# Patient Record
Sex: MALE
[De-identification: names, ages, dates, MRNs, and addresses within clinical notes are randomized per-mention and may not be internally consistent; named-entity substitution may affect disease eponyms.]

---

## 2017-04-01 ENCOUNTER — HOSPITAL ENCOUNTER (INPATIENT)
Dept: HOSPITAL 31 - C.ER | Age: 52
LOS: 3 days | Discharge: HOME | DRG: 293 | End: 2017-04-04
Attending: HOSPITALIST | Admitting: HOSPITALIST
Payer: COMMERCIAL

## 2017-04-01 VITALS — BODY MASS INDEX: 29.4 KG/M2

## 2017-04-01 DIAGNOSIS — R94.31: ICD-10-CM

## 2017-04-01 DIAGNOSIS — Z87.01: ICD-10-CM

## 2017-04-01 DIAGNOSIS — E78.5: ICD-10-CM

## 2017-04-01 DIAGNOSIS — Z91.14: ICD-10-CM

## 2017-04-01 DIAGNOSIS — I11.0: Primary | ICD-10-CM

## 2017-04-01 DIAGNOSIS — J45.909: ICD-10-CM

## 2017-04-01 DIAGNOSIS — I50.43: ICD-10-CM

## 2017-04-01 DIAGNOSIS — Z87.891: ICD-10-CM

## 2017-04-01 DIAGNOSIS — R79.89: ICD-10-CM

## 2017-04-01 DIAGNOSIS — E05.90: ICD-10-CM

## 2017-04-01 DIAGNOSIS — R06.00: ICD-10-CM

## 2017-04-01 LAB
ALBUMIN/GLOB SERPL: 1.3 {RATIO} (ref 1–2.1)
ALP SERPL-CCNC: 55 U/L (ref 38–126)
ALT SERPL-CCNC: 34 U/L (ref 21–72)
AST SERPL-CCNC: 41 U/L (ref 17–59)
BASOPHILS # BLD AUTO: 0 K/UL (ref 0–0.2)
BASOPHILS NFR BLD: 0.4 % (ref 0–2)
BILIRUB SERPL-MCNC: 1.2 MG/DL (ref 0.2–1.3)
BILIRUB UR-MCNC: NEGATIVE MG/DL
BUN SERPL-MCNC: 10 MG/DL (ref 9–20)
CALCIUM SERPL-MCNC: 9 MG/DL (ref 8.6–10.4)
CHLORIDE SERPL-SCNC: 102 MMOL/L (ref 98–107)
CO2 SERPL-SCNC: 20 MMOL/L (ref 22–30)
EOSINOPHIL # BLD AUTO: 0.1 K/UL (ref 0–0.7)
EOSINOPHIL NFR BLD: 1.4 % (ref 0–4)
ERYTHROCYTE [DISTWIDTH] IN BLOOD BY AUTOMATED COUNT: 13.8 % (ref 11.5–14.5)
GLOBULIN SER-MCNC: 3.5 GM/DL (ref 2.2–3.9)
GLUCOSE SERPL-MCNC: 84 MG/DL (ref 75–110)
GLUCOSE UR STRIP-MCNC: NORMAL MG/DL
HCT VFR BLD CALC: 48.1 % (ref 35–51)
KETONES UR STRIP-MCNC: NEGATIVE MG/DL
LEUKOCYTE ESTERASE UR-ACNC: (no result) LEU/UL
LYMPHOCYTES # BLD AUTO: 1.2 K/UL (ref 1–4.3)
LYMPHOCYTES NFR BLD AUTO: 25.5 % (ref 20–40)
MCH RBC QN AUTO: 28.9 PG (ref 27–31)
MCHC RBC AUTO-ENTMCNC: 32.3 G/DL (ref 33–37)
MCV RBC AUTO: 89.4 FL (ref 80–94)
MONOCYTES # BLD: 0.4 K/UL (ref 0–0.8)
MONOCYTES NFR BLD: 8.2 % (ref 0–10)
NRBC BLD AUTO-RTO: 0.1 % (ref 0–2)
PH UR STRIP: 5 [PH] (ref 5–8)
PLATELET # BLD: 196 K/UL (ref 130–400)
PMV BLD AUTO: 8.9 FL (ref 7.2–11.7)
POTASSIUM SERPL-SCNC: 4.1 MMOL/L (ref 3.6–5.2)
PROT SERPL-MCNC: 7.9 G/DL (ref 6.3–8.3)
PROT UR STRIP-MCNC: NEGATIVE MG/DL
RBC # UR STRIP: NEGATIVE /UL
SODIUM SERPL-SCNC: 141 MMOL/L (ref 132–148)
SP GR UR STRIP: 1 (ref 1–1.03)
UROBILINOGEN UR-MCNC: NORMAL MG/DL (ref 0.2–1)
WBC # BLD AUTO: 4.5 K/UL (ref 4.8–10.8)

## 2017-04-01 RX ADMIN — METOPROLOL SUCCINATE SCH MG: 50 TABLET, EXTENDED RELEASE ORAL at 23:18

## 2017-04-01 RX ADMIN — IPRATROPIUM BROMIDE AND ALBUTEROL SULFATE SCH ML: .5; 3 SOLUTION RESPIRATORY (INHALATION) at 12:15

## 2017-04-01 RX ADMIN — IPRATROPIUM BROMIDE AND ALBUTEROL SULFATE SCH ML: .5; 3 SOLUTION RESPIRATORY (INHALATION) at 12:30

## 2017-04-01 NOTE — C.PDOC
History Of Present Illness


The patient, a 52 y/o male whose PMHx includes cardiac-related issues and HTN, 

presents to the ED for evaluation of shortness of breath which began a couple 

days ago. Patient denies fever, chills, cough, and chest pain. 





PMD: Dr. Noonan


Time Seen by Provider: 17 12:04


Chief Complaint (Nursing): Shortness Of Breath


History Per: Patient


History/Exam Limitations: no limitations


Onset/Duration Of Symptoms: Days


Current Symptoms Are (Timing): Still Present


Quality: denies: "Pain"


Current Respiratory Medications: See Home Med List


Associated Symptoms: denies: Fever, Chills, Chest Pain, Bloody Cough, 

Productive Cough


Additional History Per: Patient





Past Medical History


Reviewed: Historical Data, Nursing Documentation, Vital Signs


Vital Signs: 


 Last Vital Signs











Temp  97.9 F   17 12:03


 


Pulse  107 H  17 14:10


 


Resp  22   17 14:10


 


BP  146/104 H  17 14:10


 


Pulse Ox  93 L  17 15:09














- Medical History


PMH: CHF, HTN, Hypercholesterolemia, Hyperthyroidism, Pneumonia


   Denies: Chronic Kidney Disease


Surgical History: No Surg Hx


Family History: States: Unknown Family Hx





- Social History


Hx Alcohol Use: No


Hx Substance Use: No





- Immunization History


Hx Tetanus Toxoid Vaccination: No


Hx Influenza Vaccination: No


Hx Pneumococcal Vaccination: No





Review Of Systems


Except As Marked, All Systems Reviewed And Found Negative.


Constitutional: Negative for: Fever, Chills


Cardiovascular: Negative for: Chest Pain


Respiratory: Positive for: Shortness of Breath.  Negative for: Cough





Physical Exam





- Physical Exam


Appears: Non-toxic, No Acute Distress


Skin: Normal Color, Warm, Dry


Head: Atraumatic, Normacephalic


Eye(s): bilateral: Normal Inspection, EOMI


Oral Mucosa: Moist


Neck: Supple


Chest: Symmetrical, No Deformity, No Tenderness


Cardiovascular: Rhythm Regular, No Murmur


Respiratory: Rales (crackes at bilateral bases ), No Rhonchi, No Wheezing


Gastrointestinal/Abdominal: Soft, No Tenderness, No Guarding, No Rebound


Back: Normal Inspection, No Vertebral Tenderness, No Paraspinal Tenderness


Extremity: Normal ROM, Capillary Refill (less than 2 seconds), No Swelling


Pulses: Left Dorsalis Pedis: Normal, Right Dorsalis Pedis: Normal


Neurological/Psych: Oriented x3, Normal Speech, Normal Cognition


Gait: Steady





ED Course And Treatment





- Laboratory Results


Result Diagrams: 


 17 12:38





 17 12:38


Lab Interpretation: Normal


ECG: Interpreted By Me


ECG Rhythm: Sinus Tachycardia


ECG Interpretation: No Acute Changes


Rate From EC


O2 Sat by Pulse Oximetry: 93


Pulse Ox Interpretation: Abnormal





- Radiology


CXR: Interpreted by Me


CXR Interpretation: Yes: Cardiomegaly





- Other Rad


  ** CXR


X-Ray: Read By Radiologist


Interpretation: Accession No. : [id number].  Patient Name / ID : MAGDY FLORES  / [id number].  Exam Date : 2017 12:15:38 ( Approved ).  Study 

Comment :  Sex / Age : M  / 51Y.  Creator : Jose Leigh.  Dictator : Jose Leigh.   :  Approver : Jose Leigh.  Approver2 :  Report 

Date : 2017 12:29:41.  My Comment :  *************************************

**********************************************.  HISTORY:  SOB.  COMPARISON:  

Comparison is made to the previous study dated 2016.  TECHNIQUE:  Chest 

PA and lateral.  FINDINGS:  LUNGS:  Mild pulmonary vascular congestion P.  

PLEURA:  No significant pleural effusion identified. No pneumothorax apparent.  

CARDIOVASCULAR:  Cardiomegaly is again noted.  OSSEOUS STRUCTURES:  No 

significant abnormalities.  VISUALIZED UPPER ABDOMEN:  Normal.  OTHER FINDINGS:

  None.  IMPRESSION:  Cardiomegaly and mild pulmonary vascular congestion. No 

evidence of significant pleural effusion.


Progress Note: labs, EKG, CXR ordered and reviewed. Patient received Albuterol 

INH.  Treated with lasix 40 mg IV.  On re-evaluation lungs clear


Reassessment Condition: Improved





- Physician Consult Information


Physician Contacted: Jean-Pierre Noonan


Outcome Of Conversation: Admit to hospitalist service





Disposition


Discussed With Dr.: Linsey Orozco


Doctor Will See Patient In The: Hospital


Counseled Patient/Family Regarding: Studies Performed, Diagnosis





- Disposition


Disposition: HOSPITALIZED


Disposition Time: 14:40


Condition: STABLE





- POA


Present On Arrival: None





- Clinical Impression


Clinical Impression: 


 Chronic congestive heart failure, Dyspnea, HTN (hypertension)





- PA / NP / Resident Statement


MD/DO has reviewed & agrees with the documentation as recorded.





- Scribe Statement


The provider has reviewed the documentation as recorded by the Scribe (Yamileth Coronado)





All medical record entries made by the Scribe were at my direction and 

personally dictated by me. I have reviewed the chart and agree that the record 

accurately reflects my personal performance of the history, physical exam, 

medical decision making, and the department course for this patient. I have 

also personally directed, reviewed, and agree with the discharge instructions 

and disposition.





Decision To Admit





- Pt Status Changed To:


Hospital Disposition Of: Observation





- .


Bed Request Type: Telemetry


Admitting Physician: Linsey Orozco


Patient Diagnosis: 


 Chronic congestive heart failure, Dyspnea, HTN (hypertension)

## 2017-04-01 NOTE — CP.PCM.CON
History of Present Illness





- History of Present Illness


History of Present Illness: 


52 y/o male admitted with Exac of chf.  pt reportedly has an EF of 20%.  pt 

admits to 3 pillow orthopnea, pnd, and sig rosales.  no cp, no syncope, no palp.  

pt had similar episode last year.  stress revealed no reversible defects.  Pt 

was considering an AICD.  Of note pt felt congested several weeks ago and thus 

stopped all his heart failure medications.  pt ekg reveals ST with ST 

depressions in v5 and v6.  Trop mildly elevated.  





Review of Systems





- Constitutional


Constitutional: absent: As Per HPI, Anorexia, Chills, Daytime Sleepiness, 

Excessive Sweating, Fatigue, Fever, Frequent Falls, Headache, Increased Appetite

, Lethargy, Malaise, Night Sweats, Snoring, Sleep Apnea, Weight Gain, Weight 

Loss, Weakness, Other





- EENT


Eyes: absent: As Per HPI, Blind Spots, Blurred Vision, Change in Vision, 

Decreased Night Vision, Diplopia, Discharge, Dry Eye, Exophthalmos, Floaters, 

Irritation, Itchy Eyes, Loss of Peripheral Vision, Pain, Photophobia, Requires 

Corrective Lenses, Sees Flashes, Spots in Vision, Tunnel Vision, Other Visual 

Disturbances, Loss of Vision, Other


Ears: absent: As Per HPI, Decreased Hearing, Ear Discharge, Ear Pain, Tinnitus, 

Abnormal Hearing, Disequilibrium, Dizziness, Other


Nose/Mouth/Throat: absent: As Per HPI, Epistaxis, Nasal Congestion, Nasal 

Discharge, Nasal Obstruction, Nasal Trauma, Nose Pain, Post Nasal Drip, Sinus 

Pain, Sinus Pressure, Bleeding Gums, Change in Voice, Dental Pain, Dry Mouth, 

Dysphagia, Halitosis, Hoarsness, Lip Swelling, Mouth Lesions, Mouth Pain, 

Odynophagia, Sore Throat, Throat Swelling, Tongue Swelling, Facial Pain, Neck 

Pain, Neck Mass, Other





- Cardiovascular


Cardiovascular: As Per HPI





- Respiratory


Respiratory: As Per HPI





- Gastrointestinal


Gastrointestinal: absent: As Per HPI, Abdominal Pain, Belching, Bloating, 

Change in Bowel Habits, Change in Stool Character, Coffee Ground Emesis, 

Constipation, Cramping, Diarrhea, Dyspepsia, Dysphagia, Early Satiety, 

Excessive Flatus, Fecal Incontinence, Heartburn, Hematemesis, Hematochezia, 

Loose Stools, Melena, Nausea, Odynophagia, Temesmus, Vomiting, Other





- Genitourinary


Genitourinary: absent: As Per HPI, Change in Urinary Stream, Difficulty 

Urinating, Dysuria, Flank Pain, Hematuria, Pyuria, Nocturia, Urinary 

Incontinence, Urinary Frequency, Urinary Hesitance, Urinary Urgency, Voiding 

Freq/Small Amts, Freq UTI, Hx Renal/Bladder Calculi, Hx /Renal Surgery, 

Bladder Distension, Other





- Integumentary


Integumentary: absent: As Per HPI, Acne, Alopecia, Bleeding Lesions, Change in 

Hair, Change in Nails, Change in Pigmentation, Changing Lesions, Dry Skin, 

Erythema, Furuncle, Hirsutism, Lesions, New Lesions, Non-Healing Lesions, 

Photosensitivity, Pruritus, Rash, Skin Pain, Skin Ulcer, Sores, Striae, Swelling

, Unusual Bruising, Wounds, Jaundice, Other





- Neurological


Neurological: absent: As Per HPI, Abnormal Gait, Abnormal Hearing, Abnormal 

Movements, Abnormal Speech, Behavioral Changes, Burning Sensations, Confusion, 

Convulsions, Disequilibrium, Dizziness, Numbness, Focal Weakness, Frequent Falls

, Headaches, Lack of Coordination, Loss of Vision, Memory Loss, Paresthesias, 

Radicular Pain, Restless Legs, Sensory Deficit, Syncope, Tingling, Tremor, 

Vertigo, Weakness, Other Visual Disturbances, Other





- Psychiatric


Psychiatric: absent: As Per HPI, Abnormal Sleep Pattern, Anhedonia, Anxiety, 

Auditory Hallucinations, Behavioral Changes, Change in Appetite, Change in 

Libido, Confusion, Depression, Difficulty Concentrating, Hallucinations, 

Homicidal Ideation, Hopelessness, Irritability, Memory Loss, Mood Swings, Panic 

Attacks, Paranoia, Suicidal Ideation, Visual Hallucinations, Tactile 

Hallucinations, Other





Past Patient History





- Infectious Disease


Hx of Infectious Diseases: None





- Past Medical History & Family History


Past Medical History?: Yes





- Past Social History


Smoking Status: Former Smoker





- CARDIAC


Hx Congestive Heart Failure: Yes


Hx Hypercholesterolemia: Yes


Hx Hypertension: Yes





- PULMONARY


Hx Pneumonia: Yes





- HEENT


Other/Comment: wears reading glasses





- RENAL


Hx Chronic Kidney Disease: No





- ENDOCRINE/METABOLIC


Hx Hyperthyroidism: Yes





- MUSCULOSKELETAL/RHEUMATOLOGICAL


Hx Falls: No





- PSYCHIATRIC


Hx Substance Use: No





- SURGICAL HISTORY


Hx Surgeries: No





- ANESTHESIA


Hx Anesthesia: No


Hx Anesthesia Reactions: No





Meds


Allergies/Adverse Reactions: 


 Allergies











Allergy/AdvReac Type Severity Reaction Status Date / Time


 


enalapril maleate Allergy Intermediate SHORTNESS Verified 03/17/16 23:43





[From Vasotec]   OF BREATH  


 


enalaprilat dihydrate Allergy Intermediate SWELLING Verified 03/17/16 23:43





[From Vasotec]     


 


levofloxacin Allergy Unknown SHORTNESS Verified 03/17/16 23:43





   OF BREATH  














- Medications


Medications: 


 Current Medications





Albuterol/Ipratropium (Duoneb 3 Mg/0.5 Mg (3 Ml) Ud)  3 ml INH RQ6 PRN


   PRN Reason: Shortness of Breath


Aspirin (Aspirin Chewable)  81 mg PO DAILY [facility]


   Last Admin: 04/01/17 16:53 Dose:  81 mg


Aspirin (Aspirin)  325 mg PO DAILY [facility]


Enoxaparin Sodium (Lovenox)  90 mg SC Q12H [facility]


Enoxaparin Sodium (Lovenox)  40 mg SC ONCE ONE


   Stop: 04/01/17 21:31


Famotidine (Pepcid)  20 mg PO BID [facility]


   Last Admin: 04/01/17 18:41 Dose:  20 mg


Furosemide (Lasix)  40 mg IVP Q12 [facility]


Losartan Potassium (Cozaar)  50 mg PO DAILY [facility]


   Last Admin: 04/01/17 16:53 Dose:  50 mg


Metoprolol Succinate (Toprol Xl)  50 mg PO Q12 [facility]


Rosuvastatin Calcium (Crestor)  10 mg PO HS [facility]











Physical Exam





- Constitutional


Appears: Non-toxic





- Head Exam


Head Exam: ATRAUMATIC, NORMAL INSPECTION, NORMOCEPHALIC





- Eye Exam


Eye Exam: EOMI, Normal appearance, PERRL


Pupil Exam: NORMAL ACCOMODATION, PERRL





- ENT Exam


ENT Exam: Mucous Membranes Moist, Normal Exam





- Neck Exam


Additional comments: 


mild hjr





- Respiratory Exam


Respiratory Exam: Rales


Additional comments: 


mild bibasilar rales





- Cardiovascular Exam


Cardiovascular Exam: Tachycardia, REGULAR RHYTHM, Systolic Murmur





- GI/Abdominal Exam


GI & Abdominal Exam: Normal Bowel Sounds, Soft





- Extremities Exam


Additional comments: 


pedal edema.  pulses 2+





- Back Exam


Back exam: NORMAL INSPECTION





- Neurological Exam


Neurological exam: Alert, CN II-XII Intact, Normal Gait, Oriented x3, Reflexes 

Normal





- Psychiatric Exam


Psychiatric exam: Normal Affect, Normal Mood





- Skin


Skin Exam: Dry, Intact, Normal Color, Warm





Results





- Vital Signs


Recent Vital Signs: 


 Last Vital Signs











Temp  98 F   04/01/17 17:24


 


Pulse  114 H  04/01/17 18:00


 


Resp  20   04/01/17 17:24


 


BP  139/99 H  04/01/17 18:41


 


Pulse Ox  96   04/01/17 17:24














- Labs


Result Diagrams: 


 04/02/17 06:15





 04/02/17 06:15


Labs: 


 Laboratory Results - last 24 hr











  04/01/17 04/01/17





  16:44 20:02


 


Magnesium  1.8 


 


Total Creatine Kinase   204 H


 


CK-MB (Mass)   2.88


 


Troponin I, Quant   0.0820














- EKG Data


EKG Interpreted by: Myself


EKG shows normal: Sinus rhythm


Rate: Tachycardia





- EKG Data


Interpretation: Acute Ischemia





Assessment & Plan


(1) Dyspnea


Status: Acute





(2) Hyperlipidemia


Status: Acute





(3) HTN (hypertension)


Status: Chronic





(4) Acute on chronic combined systolic and diastolic congestive heart failure


Status: Acute





(5) EKG abnormality


Status: Acute


Comment: SUSPICIOUS FOR ISCHEMIA.








(6) Troponin I above reference range


Status: Acute








- Assessment and Plan (Free Text)


Plan: 


GIVEN EKG CHANGES WOULD CHANGE BB TO TOPROL XL 50 Q12


FULL ANTICOAG


TREND TROP


CONTINUE TELE


MONITOR LYTES


REPEAT EKG ONCE RATE CONTROLLED.


WILL CONSIDER CARDIAC CATH.


75 MIN TOTAL

## 2017-04-01 NOTE — RAD
HISTORY:

SOB  



COMPARISON:

Comparison is made to the previous study dated 03/18/2016



TECHNIQUE:

Chest PA and lateral



FINDINGS:



LUNGS:

Mild pulmonary vascular congestion P



PLEURA:

No significant pleural effusion identified. No pneumothorax apparent.



CARDIOVASCULAR:

Cardiomegaly is again noted



OSSEOUS STRUCTURES:

No significant abnormalities.



VISUALIZED UPPER ABDOMEN:

Normal.



OTHER FINDINGS:

None.



IMPRESSION:

Cardiomegaly and mild pulmonary vascular congestion. No evidence of 

significant pleural effusion.

## 2017-04-01 NOTE — CP.PCM.HP
<Rica Escamilla - Last Filed: 04/01/17 16:23>





History of Present Illness





- History of Present Illness


History of Present Illness: 


CC:  shortness of breath for 3 days





HPI: Patient is a 51year old male PMHx of CHF, HTN, asthma presenting with SOB 

for 3 days. Patient reported he did not come in earlier as he believed it was 

improving but it worsened greatly this morning as he was becoming short of 

breath upon working 10feet to the shower. Patient reported he saw his PMD Dr. Noonan 2 months ago and had not taken his medications for over a week as he didn

't feel like doing so and reported they were making him congested. Patient also 

states that he had been advised regarding AICD placement for over a year but is 

still thinking about it. Patient reports this morning he started feeling faint 

as well as short of breath when walking but denied any acute chest pain. 

Patient was last seen in  in 2016 and was in the ICU for 24hours due to "

fluid in lungs." Patient reports having to use 3 pillows to sleep otherwise he 

feels short of breath. Patient denied chest pain, palpitations, abdominal pain, 

nausea, vomiting, diarrhea, constipation, urinary complaints, pain in his legs. 

Patient denied any recent travel and denied any sick contacts. Patient admits 

to nonproductive cough, unintentional weight gain of 5 pounds in 2 months, and 

orthopnea.


As per medical records, patient had a stress test 1 year ago with an EF of 20%.





PMD: Dr. Noonan


PMHx: CHF, HTN, asthma


Meds: please see list


ALL: enalapril, levofloxacin


PSurg: denies


PHospitalization: 2016 for CHF exacerbation- was in ICU for 1 day


Family Hx: denies history of MI, CVA, cancer


Social Hx: quit tobacco abuse 2012 used to smoke 1ppd for 15 years. Drinks EtOH 

socially. Denies drug abuse. Lives with ex wife's mom. Works as customer 

service.





ROS: + shortness of breath, + cough, + unintentional weight gain of 5 pounds in 

2 months, + orthopnea





ED Course: CXR showed cardiomegaly and mild pulmonary vascular congestion. No 

evidence of significant pleural effusion. EKG showed sinus tach, possible L 

atrial enlargement, L axis deviation, LVH. Patient had 1 dose of lasix 40mg IVP.





Present on Admission





- Present on Admission


Any Indicators Present on Admission: No





Review of Systems





- Constitutional


Constitutional: As Per HPI, Weight Gain.  absent: Chills, Fever





- EENT


Eyes: As Per HPI.  absent: Blurred Vision


Ears: As Per HPI.  absent: Dizziness


Nose/Mouth/Throat: As Per HPI.  absent: Nasal Congestion, Sore Throat





- Cardiovascular


Cardiovascular: As Per HPI, Dyspnea, Dyspnea on Exertion, Orthopnea.  absent: 

Chest Pain, Chest Pain at Rest, Edema, Palpitations, Pedal Edema, Syncope





- Respiratory


Respiratory: As Per HPI, Dyspnea, Dyspnea on Exertion.  absent: Cough, 

Hemoptysis, Wheezing, Chest Congestion





- Gastrointestinal


Gastrointestinal: As Per HPI.  absent: Abdominal Pain, Constipation, Diarrhea, 

Nausea, Vomiting





- Genitourinary


Genitourinary: As Per HPI, Urinary Frequency (increased urinary frequency which 

patient associates with lasix).  absent: Dysuria, Nocturia





- Musculoskeletal


Musculoskeletal: As Per HPI.  absent: Numbness, Tingling





- Integumentary


Integumentary: As Per HPI.  absent: Dry Skin, Rash





- Neurological


Neurological: As Per HPI.  absent: Dizziness, Numbness, Focal Weakness, Syncope





- Psychiatric


Psychiatric: As Per HPI.  absent: Anxiety, Depression





- Endocrine


Endocrine: As Per HPI, Polyuria.  absent: Palpitations, Polydipsia, Polyphagia





- Hematologic/Lymphatic


Hematologic: As Per HPI.  absent: Easy Bleeding, Easy Bruising





Past Patient History





- Infectious Disease


Hx of Infectious Diseases: None





- Past Medical History & Family History


Past Medical History?: Yes





- Past Social History


Smoking Status: Former Smoker





- CARDIAC


Hx Congestive Heart Failure: Yes


Hx Hypercholesterolemia: Yes


Hx Hypertension: Yes





- PULMONARY


Hx Pneumonia: Yes





- HEENT


Other/Comment: wears reading glasses





- RENAL


Hx Chronic Kidney Disease: No





- ENDOCRINE/METABOLIC


Hx Hyperthyroidism: Yes





- MUSCULOSKELETAL/RHEUMATOLOGICAL


Hx Falls: No





- PSYCHIATRIC


Hx Substance Use: No





- SURGICAL HISTORY


Hx Surgeries: No





- ANESTHESIA


Hx Anesthesia: No


Hx Anesthesia Reactions: No





Meds


Allergies/Adverse Reactions: 


 Allergies











Allergy/AdvReac Type Severity Reaction Status Date / Time


 


enalapril maleate Allergy Intermediate SHORTNESS Verified 03/17/16 23:43





[From Vasotec]   OF BREATH  


 


enalaprilat dihydrate Allergy Intermediate SWELLING Verified 03/17/16 23:43





[From Vasotec]     


 


levofloxacin Allergy Unknown SHORTNESS Verified 03/17/16 23:43





   OF BREATH  














Physical Exam





- Constitutional


Appears: Well, Non-toxic, No Acute Distress





- Head Exam


Head Exam: ATRAUMATIC, NORMAL INSPECTION





- Eye Exam


Eye Exam: Normal appearance.  absent: Conjunctival injection, Scleral icterus





- ENT Exam


ENT Exam: Mucous Membranes Moist





- Neck Exam


Neck exam: Positive for: Normal Inspection.  Negative for: Tenderness





- Respiratory Exam


Respiratory Exam: Clear to Auscultation Bilateral, NORMAL BREATHING PATTERN.  

absent: Rales, Rhonchi, Wheezes





- Cardiovascular Exam


Cardiovascular Exam: Tachycardia, REGULAR RHYTHM, +S4.  absent: Systolic Murmur





- GI/Abdominal Exam


GI & Abdominal Exam: Normal Bowel Sounds, Soft.  absent: Firm, Guarding, Rigid, 

Tenderness





- Extremities Exam


Extremities exam: Positive for: normal capillary refill, normal inspection, 

pedal pulses present.  Negative for: pedal edema





- Back Exam


Back exam: NORMAL INSPECTION.  absent: rash noted





- Neurological Exam


Neurological exam: Alert, Oriented x3





- Psychiatric Exam


Psychiatric exam: Normal Affect, Normal Mood





- Skin


Skin Exam: Dry, Intact, Normal Color, Warm





Results





- Vital Signs


Recent Vital Signs: 





 Last Vital Signs











Temp  97.9 F   04/01/17 12:03


 


Pulse  107 H  04/01/17 14:10


 


Resp  22   04/01/17 14:10


 


BP  146/104 H  04/01/17 14:10


 


Pulse Ox  93 L  04/01/17 14:33














- Labs


Result Diagrams: 


 04/01/17 12:38





 04/01/17 12:38





Assessment & Plan





- Assessment and Plan (Free Text)


Assessment: 


51 year old male PMHx CHF, HTN, asthma admitted for shortness of breath


Plan: 


Acute systolic CHF exacerbation


Lasix 40mg IVP Q12


Losartan 50mg PO daily


Lopressor 25mg PO BID


ASA 81mg PO daily


Crestor 10mg PO HS


f/u Echo


f/u DEBRA and EKG


proBNP 2370


D-dimer < 200


CXR 4/1/17: cardiomegaly and mild pulmonary vascular congestion. No evidence of 

significant pleural effusion


f/u AM labs


Cardio consult Dr. Lazcano f/u reccs





Hypertension


Losartan 50mg PO daily


Lopressor 25mg PO BID


ASA 81mg PO daily


Crestor 10mg PO HS


Monitor





Asthma


Duoneb 3ml INH Q6 PRN





PPX


Lovenox 40mg SC daily


Pepcid 20mg PO BID


SCDs


Heart Healthy Diet


Is and Os


Measure Daily Weight


Heart healthy diet with fluid restriction 1500cc/hr





Plan discussed with Dr. Ernesto Escamilla PGY1








<ErnestoLinsey ROSANGELA - Last Filed: 04/01/17 18:33>





Results





- Vital Signs


Recent Vital Signs: 





 Last Vital Signs











Temp  98 F   04/01/17 17:24


 


Pulse  114 H  04/01/17 17:24


 


Resp  20   04/01/17 17:24


 


BP  139/99 H  04/01/17 17:24


 


Pulse Ox  96   04/01/17 17:24














- Labs


Result Diagrams: 


 04/01/17 12:38





 04/01/17 12:38


Labs: 





 Laboratory Results - last 24 hr











  04/01/17





  16:44


 


Magnesium  1.8














Attending/Attestation





- Attestation


I have personally seen and examined this patient.: Yes


I have fully participated in the care of the patient.: Yes


I have reviewed all pertinent clinical information: Yes


Notes (Text): 


Patient seen, examined, and case discussed with day time resident.


Patient seen in [place] [id number] ED with resident at bedside.


Patient with history of systolic congestive heart failure reports shortness of 

breathe and associated chest congestion for 2-3 weeks. Patient reports he 

thought the medications he was using was making his congestion worse and decide 

to stop on his own. Patient only takes Aspirin daily. Patient denies history of 

heart attack, denies family hx of heart attack. Patient is a former smoker. 

Patient has history of hypertension. Patient recommended for icd by his 

cardiologist but reports "he will think about it". Noted in review of EMR, 

patient has had stress test which revealed EF: 20% and he reports he is aware 

and used to follow-up with his cardiologist every month but stopped. Patient 

reports he drinks a powerade and 2-3 molina a day. Patient has dyspnea on 

exertion, associated orthopena, and uses 3 pillows at night.





Discussed with ED, Dr. Noonan would like the patient admitted to medicine and 

would like the on-call cardiology for consult. 


Discussed admitting orders with day-time resident.


Cardiology consulted (Dr. Lazcano) will see the patient later today.





Assessment/Plan: 


Acute systolic CHF exacerbation


admit to telemetry


Lasix 40mg IVP Q12


Losartan 50mg PO daily


Lopressor 25mg PO BID


ASA 81mg PO daily


Crestor 10mg PO HS


f/u Echo


f/u DEBRA and EKG, Q 6hours X2


proBNP 2370


D-dimer < 200


CXR 4/1/17: cardiomegaly and mild pulmonary vascular congestion. No evidence of 

significant pleural effusion


Cardio consult Dr. Lazcano f/u reccs


DVT ppx


Lipid panel, TSH, hgb1ac in AM





Hypertension


Losartan 50mg PO daily


Lopressor 25mg PO BID


Monitor


monitor vitals 


heart healthy diet





Asthma


Duoneb 3ml INH Q6 PRN


patient is not symptomatic





PPX


Lovenox 40mg SC daily


Pepcid 20mg PO BID


SCDs


Heart Healthy Diet


Is and Os


Measure Daily Weight


Heart healthy diet with fluid restriction 1500cc/hr

## 2017-04-02 LAB
ALBUMIN/GLOB SERPL: 1.3 {RATIO} (ref 1–2.1)
ALP SERPL-CCNC: 60 U/L (ref 38–126)
ALT SERPL-CCNC: 31 U/L (ref 21–72)
AST SERPL-CCNC: 43 U/L (ref 17–59)
BASOPHILS # BLD AUTO: 0 K/UL (ref 0–0.2)
BASOPHILS NFR BLD: 0.4 % (ref 0–2)
BILIRUB SERPL-MCNC: 1.2 MG/DL (ref 0.2–1.3)
BUN SERPL-MCNC: 18 MG/DL (ref 9–20)
CALCIUM SERPL-MCNC: 8.7 MG/DL (ref 8.6–10.4)
CHLORIDE SERPL-SCNC: 100 MMOL/L (ref 98–107)
CHOLEST SERPL-MCNC: 242 MG/DL (ref 0–199)
CO2 SERPL-SCNC: 25 MMOL/L (ref 22–30)
EOSINOPHIL # BLD AUTO: 0.1 K/UL (ref 0–0.7)
EOSINOPHIL NFR BLD: 2.3 % (ref 0–4)
ERYTHROCYTE [DISTWIDTH] IN BLOOD BY AUTOMATED COUNT: 13.4 % (ref 11.5–14.5)
GLOBULIN SER-MCNC: 3.2 GM/DL (ref 2.2–3.9)
GLUCOSE SERPL-MCNC: 94 MG/DL (ref 75–110)
HCT VFR BLD CALC: 47.5 % (ref 35–51)
LYMPHOCYTES # BLD AUTO: 1.4 K/UL (ref 1–4.3)
LYMPHOCYTES NFR BLD AUTO: 34.4 % (ref 20–40)
MAGNESIUM SERPL-MCNC: 2 MG/DL (ref 1.6–2.3)
MCH RBC QN AUTO: 29.2 PG (ref 27–31)
MCHC RBC AUTO-ENTMCNC: 32.7 G/DL (ref 33–37)
MCV RBC AUTO: 89.2 FL (ref 80–94)
MONOCYTES # BLD: 0.5 K/UL (ref 0–0.8)
MONOCYTES NFR BLD: 12.5 % (ref 0–10)
NRBC BLD AUTO-RTO: 0.1 % (ref 0–2)
PHOSPHATE SERPL-MCNC: 4.8 MG/DL (ref 2.5–4.5)
PLATELET # BLD: 200 K/UL (ref 130–400)
PMV BLD AUTO: 8.7 FL (ref 7.2–11.7)
POTASSIUM SERPL-SCNC: 3.3 MMOL/L (ref 3.6–5.2)
PROT SERPL-MCNC: 7.2 G/DL (ref 6.3–8.3)
SODIUM SERPL-SCNC: 140 MMOL/L (ref 132–148)
TSH SERPL-ACNC: 3.58 MIU/L (ref 0.46–4.68)
WBC # BLD AUTO: 4.1 K/UL (ref 4.8–10.8)

## 2017-04-02 RX ADMIN — METOPROLOL SUCCINATE SCH MG: 50 TABLET, EXTENDED RELEASE ORAL at 09:41

## 2017-04-02 RX ADMIN — ENOXAPARIN SODIUM SCH MG: 100 INJECTION SUBCUTANEOUS at 10:58

## 2017-04-02 RX ADMIN — METOPROLOL SUCCINATE SCH MG: 50 TABLET, EXTENDED RELEASE ORAL at 21:00

## 2017-04-02 RX ADMIN — ENOXAPARIN SODIUM SCH MG: 100 INJECTION SUBCUTANEOUS at 21:00

## 2017-04-02 NOTE — CP.PCM.PN
Subjective





- Date & Time of Evaluation


Date of Evaluation: 04/02/17


Time of Evaluation: 14:39





- Subjective


Subjective: 


PT FEELS BETTER.  STATES THAT HE MOST LIKELY HAS NANY.  HE HAS NOT BEEN TESTED 

FOR IT.  HE STATES HE [place] [facility] [place] BUT IS UNAWARE OF 

RESULTS.  





Objective





- Vital Signs/Intake and Output


Vital Signs (last 24 hours): 


 











Temp Pulse Resp BP Pulse Ox


 


 97.6 F   72   20   115/85   96 


 


 04/02/17 08:08  04/02/17 08:08  04/02/17 08:08  04/02/17 10:56  04/02/17 08:08








Intake and Output: 


 











 04/02/17 04/02/17





 06:59 18:59


 


Intake Total 500 600


 


Output Total 1300 


 


Balance -800 600














- Medications


Medications: 


 Current Medications





Albuterol/Ipratropium (Duoneb 3 Mg/0.5 Mg (3 Ml) Ud)  3 ml INH RQ6 PRN


   PRN Reason: Shortness of Breath


Aspirin (Aspirin)  325 mg PO DAILY [facility]


   Last Admin: 04/02/17 09:41 Dose:  325 mg


Enoxaparin Sodium (Lovenox)  90 mg SC Q12H [facility]


   Last Admin: 04/02/17 10:58 Dose:  90 mg


Famotidine (Pepcid)  20 mg PO BID [facility]


   Last Admin: 04/02/17 09:41 Dose:  20 mg


Furosemide (Lasix)  20 mg IVP DAILY [facility]


   Last Admin: 04/02/17 10:56 Dose:  20 mg


Losartan Potassium (Cozaar)  50 mg PO DAILY [facility]


   Last Admin: 04/02/17 09:41 Dose:  50 mg


Metoprolol Succinate (Toprol Xl)  50 mg PO Q12 [facility]


   Last Admin: 04/02/17 09:41 Dose:  50 mg


Rosuvastatin Calcium (Crestor)  10 mg PO HS [facility]


   Last Admin: 04/01/17 23:17 Dose:  10 mg











- Labs


Labs: 


 





 04/02/17 06:15 





 04/02/17 06:15 











- Constitutional


Appears: Well





- Head Exam


Head Exam: ATRAUMATIC, NORMAL INSPECTION, NORMOCEPHALIC





- Eye Exam


Eye Exam: EOMI, Normal appearance, PERRL


Pupil Exam: NORMAL ACCOMODATION, PERRL





- ENT Exam


ENT Exam: Mucous Membranes Moist, Normal Exam





- Neck Exam


Neck Exam: Full ROM, Normal Inspection.  absent: Lymphadenopathy





- Respiratory Exam


Respiratory Exam: Rales, NORMAL BREATHING PATTERN





- Cardiovascular Exam


Cardiovascular Exam: REGULAR RHYTHM, +S1, +S2, Murmur





- GI/Abdominal Exam


GI & Abdominal Exam: Soft, Normal Bowel Sounds.  absent: Tenderness





- Extremities Exam


Extremities Exam: Full ROM, Normal Capillary Refill, Normal Inspection.  absent

: Joint Swelling, Pedal Edema





- Back Exam


Back Exam: NORMAL INSPECTION





- Neurological Exam


Neurological Exam: Alert, Awake, CN II-XII Intact, Normal Gait, Oriented x3





- Psychiatric Exam


Psychiatric exam: Normal Affect, Normal Mood





- Skin


Skin Exam: Dry, Intact, Normal Color, Warm





Assessment and Plan


(1) Dyspnea


Status: Acute





(2) Hyperlipidemia


Status: Acute





(3) HTN (hypertension)


Status: Chronic





(4) Acute on chronic combined systolic and diastolic congestive heart failure


Status: Acute





(5) EKG abnormality


Status: Acute





(6) Troponin I above reference range


Status: Acute








- Assessment and Plan (Free Text)


Plan: 


WILL ATTEMPT TO LOCATE PTS RECORDS [place] [facility] [place]


RECHECK TROP NOW


RECHECK EKG NOW


I AM CONSIDERING CARDIAC CATH GIVEN ECG CHANGES AND CHF


WOULD EVAL FOR NANY AS OUTPT


REINFORCE MED COMPLIANCE.


MONITOR LABS.


CONTINUE FULL ANTICOAG AT THIS TIME.


45 MIN TOTAL CARE TIME

## 2017-04-02 NOTE — CP.PCM.PN
<Jadiel Rosado - Last Filed: 04/02/17 20:39>





Subjective





- Date & Time of Evaluation


Date of Evaluation: 04/02/17


Time of Evaluation: 10:00





- Subjective


Subjective: 








Dr. Arcos Service:





Patient seen and discussd with Dr. Arcos primary medical attending.  Patient 

reports coming to the hospital due to shortness of breath and chest pain.  But 

he says his chest pain has resolved and his breathing has improved.  He denies 

changes in vision, hearing, palliations, cough, nausea, vomiting, dsysuria, 

weakness, or joint pain. 





Objective





- Vital Signs/Intake and Output


Vital Signs (last 24 hours): 


 











Temp Pulse Resp BP Pulse Ox


 


 97.6 F   72   20   115/85   96 


 


 04/02/17 08:08  04/02/17 08:08  04/02/17 08:08  04/02/17 10:56  04/02/17 08:08








Intake and Output: 


 











 04/02/17 04/02/17





 06:59 18:59


 


Intake Total 500 


 


Output Total 1300 


 


Balance -800 














- Medications


Medications: 


 Current Medications





Albuterol/Ipratropium (Duoneb 3 Mg/0.5 Mg (3 Ml) Ud)  3 ml INH RQ6 PRN


   PRN Reason: Shortness of Breath


Aspirin (Aspirin)  [facility]ILY [facility]


   Last Admin: 04/02/17 09:41 Dose:  325 mg


Enoxaparin Sodium (Lovenox)  90 mg SC [facility]H [facility]


   Last Admin: 04/02/17 10:58 Dose:  90 mg


Famotidine (Pepcid)  20 mg [facility] BID [facility]


   Last Admin: 04/02/17 09:41 Dose:  20 mg


Furosemide (Lasix)  20 mg IVP [facility]ILY [facility]


   Last Admin: 04/02/17 10:56 Dose:  20 mg


Losartan Potassium (Cozaar)  50 mg PO [facility]ILY [facility]


   Last Admin: 04/02/17 09:41 Dose:  50 mg


Metoprolol Succinate (Toprol Xl)  50 mg PO Q12 [facility]


   Last Admin: 04/02/17 09:41 Dose:  50 mg


Rosuvastatin Calcium (Crestor)  10 mg PO HS [facility]


   Last Admin: 04/01/17 23:17 Dose:  10 mg











- Labs


Labs: 


 





 04/02/17 06:15 





 04/02/17 06:15 











- Constitutional


Appears: Non-toxic, No Acute Distress





- Head Exam


Head Exam: ATRAUMATIC, NORMAL INSPECTION, NORMOCEPHALIC





- Eye Exam


Eye Exam: Normal appearance





- ENT Exam


ENT Exam: Normal Exam





- Respiratory Exam


Respiratory Exam: Clear to Ausculation Bilateral.  absent: Rhonchi, Wheezes





- Cardiovascular Exam


Cardiovascular Exam: REGULAR RHYTHM, RRR, +S1, +S2, Murmur.  absent: Gallop, 

Rubs





- GI/Abdominal Exam


GI & Abdominal Exam: Soft, Normal Bowel Sounds.  absent: Distended, Firm, 

Tenderness





- Extremities Exam


Extremities Exam: Normal Inspection.  absent: Pedal Edema





- Back Exam


Back Exam: NORMAL INSPECTION





- Psychiatric Exam


Psychiatric exam: Normal Affect, Normal Mood





- Skin


Skin Exam: Normal Color, Warm





Assessment and Plan





- Assessment and Plan (Free Text)


Assessment: 


Acute systolic CHF exacerbation


4/2:


Dr. Lazcano consulted, he is now on full anticoagulation, have keptd him NPO past 

midnight in case he goes for cardiac cath tomorrow, Krista are negative.  

continue current medication. 





Previous note:


Lasix 40mg IVP Q12


Losartan 50mg PO daily


Lopressor 25mg PO BID


ASA 81mg PO daily


Crestor 10mg PO HS


f/u Echo


f/u KRISTA and EKG


proBNP 2370


D-dimer < 200


CXR 4/1/17: cardiomegaly and mild pulmonary vascular congestion. No evidence of 

significant pleural effusion


f/u AM labs


Cardio consult Dr. Lazcano f/u reccs





Hypertension


Losartan 50mg PO daily


Lopressor 25mg PO BID


ASA 81mg PO daily


Crestor 10mg PO HS


Monitor





Asthma


Duoneb 3ml INH Q6 PRN





PPX


Lovenox 40mg SC daily


Pepcid 20mg PO BID


SCDs


Heart Healthy Diet


Is and Os


Measure Daily Weight


Heart healthy diet with fluid restriction 1500cc/hr











<Pepe Arcos - Last Filed: 04/06/17 14:47>





Objective





- Vital Signs/Intake and Output


Vital Signs (last 24 hours): 


 











Temp Pulse Resp BP Pulse Ox


 


 98 F   79   20   100/62   99 


 


 04/04/17 15:53  04/04/17 15:53  04/04/17 15:53  04/04/17 15:53  04/04/17 15:53











- Labs


Labs: 


 











PT  11.9 SECONDS (9.7-12.2)   04/03/17  06:46    


 


INR  1.1   04/03/17  06:46    


 


APTT  34 SECONDS (21-34)   04/03/17  06:46    














Attending/Attestation





- Attestation


I have personally seen and examined this patient.: Yes


I have fully participated in the care of the patient.: Yes


I have reviewed all pertinent clinical information, including history, physical 

exam and plan: Yes


Notes (Text): 





04/06/17 14:46


Patient was seen and examined at bedside with the resident


No new complaints


Plan for cardiac catheterization as per cardiology


Continue current medical management


This is late computer entry

## 2017-04-03 VITALS — RESPIRATION RATE: 20 BRPM

## 2017-04-03 LAB
APTT BLD: 34 SECONDS (ref 21–34)
BASOPHILS # BLD AUTO: 0 K/UL (ref 0–0.2)
BASOPHILS NFR BLD: 0.3 % (ref 0–2)
BUN SERPL-MCNC: 21 MG/DL (ref 9–20)
CALCIUM SERPL-MCNC: 9.1 MG/DL (ref 8.6–10.4)
CHLORIDE SERPL-SCNC: 102 MMOL/L (ref 98–107)
CO2 SERPL-SCNC: 25 MMOL/L (ref 22–30)
EOSINOPHIL # BLD AUTO: 0.2 K/UL (ref 0–0.7)
EOSINOPHIL NFR BLD: 2.8 % (ref 0–4)
ERYTHROCYTE [DISTWIDTH] IN BLOOD BY AUTOMATED COUNT: 13.6 % (ref 11.5–14.5)
GLUCOSE SERPL-MCNC: 92 MG/DL (ref 75–110)
HCT VFR BLD CALC: 50.2 % (ref 35–51)
INR PPP: 1.1
LYMPHOCYTES # BLD AUTO: 2.6 K/UL (ref 1–4.3)
LYMPHOCYTES NFR BLD AUTO: 48.1 % (ref 20–40)
MAGNESIUM SERPL-MCNC: 2.1 MG/DL (ref 1.6–2.3)
MCH RBC QN AUTO: 28.9 PG (ref 27–31)
MCHC RBC AUTO-ENTMCNC: 32 G/DL (ref 33–37)
MCV RBC AUTO: 90.2 FL (ref 80–94)
MONOCYTES # BLD: 0.6 K/UL (ref 0–0.8)
MONOCYTES NFR BLD: 11.4 % (ref 0–10)
NRBC BLD AUTO-RTO: 0.1 % (ref 0–2)
PLATELET # BLD: 199 K/UL (ref 130–400)
PMV BLD AUTO: 9.4 FL (ref 7.2–11.7)
POTASSIUM SERPL-SCNC: 4.3 MMOL/L (ref 3.6–5.2)
SODIUM SERPL-SCNC: 142 MMOL/L (ref 132–148)
WBC # BLD AUTO: 5.4 K/UL (ref 4.8–10.8)

## 2017-04-03 RX ADMIN — METOPROLOL SUCCINATE SCH MG: 50 TABLET, EXTENDED RELEASE ORAL at 11:30

## 2017-04-03 RX ADMIN — METOPROLOL SUCCINATE SCH MG: 50 TABLET, EXTENDED RELEASE ORAL at 21:43

## 2017-04-03 RX ADMIN — ENOXAPARIN SODIUM SCH MG: 100 INJECTION SUBCUTANEOUS at 11:32

## 2017-04-03 NOTE — CP.PCM.PN
Subjective





- Date & Time of Evaluation


Date of Evaluation: 04/03/17


Time of Evaluation: 14:00





- Subjective


Subjective: 


pt without complaints of cp or rosales.  positive orthopnea.  repeat echo reveals 

dcm and ef of 12%.  





Objective





- Vital Signs/Intake and Output


Vital Signs (last 24 hours): 


 











Temp Pulse Resp BP Pulse Ox


 


 98.6 F   86   20   126/89   98 


 


 04/03/17 11:28  04/03/17 11:28  04/03/17 11:28  04/03/17 11:32  04/03/17 11:28











- Medications


Medications: 


 Current Medications





Aspirin (Aspirin)  325 mg PO DAILY [facility]


   Last Admin: 04/03/17 11:30 Dose:  325 mg


Bumetanide (Bumex)  1 mg IVP Q12 [facility]


   Stop: 04/04/17 22:01


Enoxaparin Sodium (Lovenox)  40 mg SC DAILY [facility]


Famotidine (Pepcid)  20 mg PO BID [facility]


   Last Admin: 04/03/17 11:30 Dose:  20 mg


Losartan Potassium (Cozaar)  50 mg PO DAILY [facility]


   Last Admin: 04/03/17 11:29 Dose:  50 mg


Metoprolol Succinate (Toprol Xl)  50 mg PO Q12 [facility]


   Last Admin: 04/03/17 11:30 Dose:  50 mg


Rosuvastatin Calcium (Crestor)  10 mg PO HS [facility]


   Last Admin: 04/02/17 21:00 Dose:  10 mg











- Labs


Labs: 


 











PT  11.9 SECONDS (9.7-12.2)   04/03/17  06:46    


 


INR  1.1   04/03/17  06:46    


 


APTT  34 SECONDS (21-34)   04/03/17  06:46    














- Constitutional


Appears: Well





- Head Exam


Head Exam: ATRAUMATIC, NORMAL INSPECTION, NORMOCEPHALIC





- Eye Exam


Eye Exam: EOMI, Normal appearance, PERRL


Pupil Exam: NORMAL ACCOMODATION, PERRL





- ENT Exam


ENT Exam: Mucous Membranes Moist, Normal Exam





- Neck Exam


Neck Exam: Full ROM, Normal Inspection.  absent: Lymphadenopathy





- Respiratory Exam


Respiratory Exam: Rales, NORMAL BREATHING PATTERN





- Cardiovascular Exam


Cardiovascular Exam: REGULAR RHYTHM, +S1, +S2, Murmur





- GI/Abdominal Exam


GI & Abdominal Exam: Soft, Normal Bowel Sounds.  absent: Tenderness





- Extremities Exam


Extremities Exam: Full ROM, Normal Capillary Refill, Normal Inspection.  absent

: Joint Swelling, Pedal Edema





- Back Exam


Back Exam: NORMAL INSPECTION





- Neurological Exam


Neurological Exam: Alert, Awake, CN II-XII Intact, Normal Gait, Oriented x3





- Psychiatric Exam


Psychiatric exam: Normal Affect, Normal Mood





- Skin


Skin Exam: Dry, Intact, Normal Color, Warm





Assessment and Plan


(1) Dyspnea


Status: Acute





(2) Hyperlipidemia


Status: Acute





(3) HTN (hypertension)


Status: Chronic





(4) Acute on chronic combined systolic and diastolic congestive heart failure


Status: Acute





(5) EKG abnormality


Status: Acute





(6) Troponin I above reference range


Status: Acute








- Assessment and Plan (Free Text)


Plan: 


 repeat echo reveals dcm and ef of 12%. 


reviewed records at [facility] [place]  pt had no cad on cath in 2013.


st last year no ischemia


d/w residents and EP regarding ppm


Pt is undecided.  I discussed this w him at length.  explained risks and 

benefits.  risk of scd.  need for elsie w/u as outpt.


decrease lovenox to 40 daily


given echo would increase diuretics to 1mg bumex bid x 2 days then decrease to 

current lasix dosing.


monitor bnp and mag


65 min total care.

## 2017-04-03 NOTE — CP.PCM.CON
<Hung Timmons - Last Filed: 04/03/17 14:00>





History of Present Illness





- History of Present Illness


History of Present Illness: 


Cardiology Consult Note- Dr. Bojorquez





Patient was seen and examined at bedside. Patient reports that he has a hx of 

CHF and was told that he may need an ICD. He says he has previously worn a life 

vest before for about one year. He currently has no acute complaints, his 

breathing is markedly improved from he first came to the hospital. 





PMHx: CHF, HTN, asthma


ALL: enalapril, levofloxacin, iodine/contrast


PSurg: denies


PHospitalization: 2016 for CHF exacerbation- was in ICU for 1 day


Family Hx: denies history of MI, CVA, cancer


Social Hx: quit tobacco abuse 2012 used to smoke 1ppd for 15 years. Drinks EtOH 

socially. Denies drug abus





Review of Systems





- Constitutional


Constitutional: absent: Anorexia, Chills, Fever, Weight Loss, Weakness





- Cardiovascular


Cardiovascular: absent: Chest Pain, Irregular Heart Rhythm, Leg Edema, 

Palpitations, Pedal Edema





- Respiratory


Respiratory: absent: Cough, Hemoptysis, Dyspnea on Exertion, Wheezing





- Gastrointestinal


Gastrointestinal: absent: Abdominal Pain, Constipation, Diarrhea, Nausea, 

Vomiting





- Genitourinary


Genitourinary: absent: Difficulty Urinating, Dysuria, Hematuria





- Musculoskeletal


Musculoskeletal: absent: Atrophy, Back Pain, Myalgias, Numbness, Tingling





- Integumentary


Integumentary: absent: Skin Ulcer, Sores, Striae, Wounds





- Neurological


Neurological: absent: Abnormal Movements, Tingling, Tremor, Weakness





- Psychiatric


Psychiatric: absent: Anhedonia, Anxiety, Panic Attacks, Suicidal Ideation





- Endocrine


Endocrine: absent: Fatigue, Palpitations





Past Patient History





- Infectious Disease


Hx of Infectious Diseases: None





- Past Medical History & Family History


Past Medical History?: Yes





- Past Social History


Smoking Status: Former Smoker


Chewing Tobacco Use: No


Cigar Use: No


Alcohol: Social


Drugs: Denies





- CARDIAC


Hx Congestive Heart Failure: Yes


Hx Hypercholesterolemia: Yes


Hx Hypertension: Yes





- PULMONARY


Hx Pneumonia: Yes





- HEENT


Other/Comment: wears reading glasses





- RENAL


Hx Chronic Kidney Disease: No





- ENDOCRINE/METABOLIC


Hx Hyperthyroidism: Yes





- MUSCULOSKELETAL/RHEUMATOLOGICAL


Hx Falls: No





- PSYCHIATRIC


Hx Substance Use: No





- SURGICAL HISTORY


Hx Surgeries: No





- ANESTHESIA


Hx Anesthesia: No


Hx Anesthesia Reactions: No





Meds


Allergies/Adverse Reactions: 


 Allergies











Allergy/AdvReac Type Severity Reaction Status Date / Time


 


enalapril maleate Allergy Intermediate SHORTNESS Verified 03/17/16 23:43





[From Vasotec]   OF BREATH  


 


enalaprilat dihydrate Allergy Intermediate SWELLING Verified 03/17/16 23:43





[From Vasotec]     


 


levofloxacin Allergy Unknown SHORTNESS Verified 03/17/16 23:43





   OF BREATH  














- Medications


Medications: 


 Current Medications





Albuterol/Ipratropium (Duoneb 3 Mg/0.5 Mg (3 Ml) Ud)  3 ml INH RQ6 PRN


   PRN Reason: Shortness of Breath


Aspirin (Aspirin)  325 mg PO DAILY [facility]


   Last Admin: 04/03/17 11:30 Dose:  325 mg


Enoxaparin Sodium (Lovenox)  90 mg SC Q12H [facility]


   Last Admin: 04/03/17 11:32 Dose:  90 mg


Famotidine (Pepcid)  20 mg PO BID [facility]


   Last Admin: 04/03/17 11:30 Dose:  20 mg


Furosemide (Lasix)  20 mg IVP DAILY [facility]


   Last Admin: 04/03/17 11:32 Dose:  20 mg


Losartan Potassium (Cozaar)  50 mg PO DAILY [facility]


   Last Admin: 04/03/17 11:29 Dose:  50 mg


Metoprolol Succinate (Toprol Xl)  50 mg PO Q12 [facility]


   Last Admin: 04/03/17 11:30 Dose:  50 mg


Rosuvastatin Calcium (Crestor)  10 mg PO HS [facility]


   Last Admin: 04/02/17 21:00 Dose:  10 mg











Physical Exam





- Constitutional


Appears: Non-toxic, No Acute Distress





- Head Exam


Head Exam: ATRAUMATIC, NORMAL INSPECTION, NORMOCEPHALIC





- Eye Exam


Pupil Exam: NORMAL ACCOMODATION, PERRL





- ENT Exam


ENT Exam: Mucous Membranes Moist





- Respiratory Exam


Respiratory Exam: Clear to Auscultation Bilateral, NORMAL BREATHING PATTERN.  

absent: Prolonged Expiratory Phase, Rales, Rhonchi, Wheezes





- Cardiovascular Exam


Cardiovascular Exam: REGULAR RHYTHM, +S1, +S2





- GI/Abdominal Exam


GI & Abdominal Exam: Normal Bowel Sounds, Soft.  absent: Distended, Firm, 

Tenderness





- Neurological Exam


Neurological exam: Alert, CN II-XII Intact, Oriented x3





- Psychiatric Exam


Psychiatric exam: Normal Affect, Normal Mood





- Skin


Skin Exam: Dry, Intact, Normal Color, Warm





Results





- Vital Signs


Recent Vital Signs: 


 Last Vital Signs











Temp  98.6 F   04/03/17 11:28


 


Pulse  86   04/03/17 11:28


 


Resp  20   04/03/17 11:28


 


BP  126/89   04/03/17 11:32


 


Pulse Ox  98   04/03/17 11:28














- Labs


Result Diagrams: 


 04/03/17 06:46





 04/03/17 06:46


Labs: 


 Laboratory Results - last 24 hr











  04/02/17 04/03/17





  06:15 06:46


 


WBC   5.4


 


RBC   5.57


 


Hgb   16.1


 


Hct   50.2


 


MCV   90.2


 


MCH   28.9


 


MCHC   32.0 L


 


RDW   13.6


 


Plt Count   199


 


MPV   9.4


 


Neut % (Auto)   37.4 L


 


Lymph % (Auto)   48.1 H


 


Mono % (Auto)   11.4 H


 


Eos % (Auto)   2.8


 


Baso % (Auto)   0.3


 


Neut #   2.0


 


Lymph #   2.6


 


Mono #   0.6


 


Eos #   0.2


 


Baso #   0.0


 


PT   11.9


 


INR   1.1


 


APTT   34


 


Sodium   142


 


Potassium   4.3


 


Chloride   102


 


Carbon Dioxide   25


 


Anion Gap   19


 


BUN   21 H


 


Creatinine   1.3


 


Est GFR ( Amer)   > 60


 


Est GFR (Non-Af Amer)   59


 


Random Glucose   92


 


Hemoglobin A1c  5.5 


 


Calcium   9.1


 


Magnesium   2.1


 


Troponin I  0.0770  0.5040 H*














Assessment & Plan





- Assessment and Plan (Free Text)


Assessment: 


Acute on Chronic CHF


Non ischemic dilated cardiomyopathy





Continue meds:


Lasix 40mg IVP Q12


Losartan 50mg PO daily


ASA 81mg PO daily


Crestor 10mg PO HS


Metoprolol Succinate 50mg Q12h





Continue full anticoagulation





Will attempt to obtain records from [facility] in regards to echos/ caths previously 

done.


First 3 ROMIs negative


DEBRA this AM positive at 0.5040





Per Dr. Lazcano, cardiac cath records from 2013 indicate that his coronaries were 

patent and functional, so no cath will be needed at this time.


Called Dr. Noonan, he is okay with us proceeding with treating his patient, 

implant ICD if necessary.





Will discuss case with Dr. Bojorquez





<Chet Bojorquez - Last Filed: 04/05/17 09:33>





Results





- Vital Signs


Recent Vital Signs: 


 Last Vital Signs











Temp  98 F   04/04/17 15:53


 


Pulse  79   04/04/17 15:53


 


Resp  20   04/04/17 15:53


 


BP  100/62   04/04/17 15:53


 


Pulse Ox  99   04/04/17 15:53














- Labs


Result Diagrams: 


 04/03/17 06:46





 04/03/17 06:46





Attending/Attestation





- Attestation


I have personally seen and examined this patient.: Yes


I have fully participated in the care of the patient.: Yes


I have reviewed all pertinent clinical information: Yes


Notes (Text): 





04/05/17 09:29


discussed at length severity of CMP pt has a need to work and agrees to a vest 

and ICD on Thursday of next week.  I will make arrangements for ICD all in 

agreement

## 2017-04-03 NOTE — CP.PCM.PN
<Silviano Zhou - Last Filed: 04/03/17 20:59>





Subjective





- Date & Time of Evaluation


Date of Evaluation: 04/03/17


Time of Evaluation: 07:45





- Subjective


Subjective: 


PGY1 Medicine Note - Dr. Song





Patient seen and examined,  no overnight events per nursing. Patient reports 

coming to the hospital due to shortness of breath and chest pain, after 

stopping his home medications for nearly 2 weeks. When asked why, he admits 

that the diuretic causes him to urinate frequently, and this becomes 

cumbersome. He states he wanted to see how he would do without the medications. 

Currently denies chest pain and SOB. Denies changes in vision, hearing, 

palliations, cough, nausea, vomiting, dsysuria, weakness, or joint pain. 





Objective





- Vital Signs/Intake and Output


Vital Signs (last 24 hours): 


 











Temp Pulse Resp BP Pulse Ox


 


 98.2 F   88   18   112/79   96 


 


 04/03/17 07:10  04/03/17 08:00  04/03/17 07:10  04/03/17 07:10  04/03/17 07:10








Intake and Output: 


 











 04/03/17 04/03/17





 06:59 18:59


 


Intake Total 500 


 


Output Total 400 


 


Balance 100 














- Medications


Medications: 


 Current Medications





Albuterol/Ipratropium (Duoneb 3 Mg/0.5 Mg (3 Ml) Ud)  3 ml INH RQ6 PRN


   PRN Reason: Shortness of Breath


Aspirin (Aspirin)  325 mg PO DAILY [facility]


   Last Admin: 04/02/17 09:41 Dose:  325 mg


Enoxaparin Sodium (Lovenox)  90 mg SC Q12H [facility]


   Last Admin: 04/02/17 21:00 Dose:  90 mg


Famotidine (Pepcid)  20 mg PO BID [facility]


   Last Admin: 04/02/17 18:25 Dose:  20 mg


Furosemide (Lasix)  20 mg IVP DAILY [facility]


   Last Admin: 04/02/17 10:56 Dose:  20 mg


Losartan Potassium (Cozaar)  50 mg PO DAILY [facility]


   Last Admin: 04/02/17 09:41 Dose:  50 mg


Metoprolol Succinate (Toprol Xl)  50 mg PO Q12 [facility]


   Last Admin: 04/02/17 21:00 Dose:  50 mg


Rosuvastatin Calcium (Crestor)  10 mg PO HS [facility]


   Last Admin: 04/02/17 21:00 Dose:  10 mg











- Labs


Labs: 


 





 04/03/17 06:46 





 04/03/17 06:46 





 











PT  11.9 SECONDS (9.7-12.2)   04/03/17  06:46    


 


INR  1.1   04/03/17  06:46    


 


APTT  34 SECONDS (21-34)   04/03/17  06:46    














- Additional Findings


Additional findings: 


- Constitutional


Appears: Non-toxic, No Acute Distress





- Head Exam


Head Exam: ATRAUMATIC, NORMAL INSPECTION, NORMOCEPHALIC





- Eye Exam


Eye Exam: Normal appearance





- ENT Exam


ENT Exam: Normal Exam





- Respiratory Exam


Respiratory Exam: Clear to Ausculation Bilateral.  absent: Rhonchi, Wheezes





- Cardiovascular Exam


Cardiovascular Exam: REGULAR RHYTHM, RRR, +S1, +S2, Murmur.  absent: Gallop, 

Rubs





- GI/Abdominal Exam


GI & Abdominal Exam: Soft, Normal Bowel Sounds.  absent: Distended, Firm, 

Tenderness





- Extremities Exam


Extremities Exam: Normal Inspection.  absent: Pedal Edema





- Back Exam


Back Exam: NORMAL INSPECTION





- Psychiatric Exam


Psychiatric exam: Normal Affect, Normal Mood





- Skin


Skin Exam: Normal Color, Warm





Assessment and Plan





- Assessment and Plan (Free Text)


Assessment: 


Acute systolic CHF exacerbation


-Pt stopped home medications for nearly 2 weeks because the diuretic causes him 

to urinate frequently, and this becomes cumbersome for him ("wanted to see how 

I would do without the medications")


-f/u Echo (pending read)


-f/u EKG 4


-Troponin 3-4 Positive (Downtrending). Troponin's 1-2 Negative. 


-EKG 3 - left ventricular hypertrophy; possible inferolateral ischemia.


-CKMB negative x2


-Cardio consult, Dr. Bojorquez, f/u recs


   -Pt previously worn a life vest before for about one year


   -Dr. Noonan is okay with team treating his patient - implant ICD if necessary

.


-Cardio consult Dr. Lazcano f/u recs


   -pt without complaints of cp or rosales.  positive orthopnea.  


   -repeat echo reveals dcm and ef of 12%.  


   [place] [facility] [place] cath in 2013.


   stress test last year no ischemia


   decrease lovenox to 40 daily


   increase diuretics to 1mg bumex bid x 2 days then decrease to current lasix 

dosing.


Previous note:


Lasix 40mg IVP Q12


Losartan 50mg PO daily


Lopressor 25mg PO BID


ASA 81mg PO daily


Crestor 10mg PO HS


f/u DEBRA and EKG


proBNP 2370


D-dimer < 200


CXR 4/1/17: cardiomegaly and mild pulmonary vascular congestion. No evidence of 

significant pleural effusion








Hypertension


4/3: BP WNL, monitor


Losartan 50mg PO daily


Lopressor 25mg PO BID


ASA 81mg PO daily


Crestor 10mg PO HS








Asthma


Duoneb 3ml INH Q6 PRN





PPX


Lovenox 40mg SC daily


Pepcid 20mg PO BID


SCDs


Heart Healthy Diet


Is and Os


Measure Daily Weight


Heart healthy diet with fluid restriction 1500cc/hr





<Bronson Song - Last Filed: 05/06/17 18:09>





Objective





- Vital Signs/Intake and Output


Vital Signs (last 24 hours): 


 











Temp Pulse Resp BP Pulse Ox


 


 98 F   79   20   100/62   99 


 


 04/04/17 15:53  04/04/17 15:53  04/04/17 15:53  04/04/17 15:53  04/04/17 15:53











- Labs


Labs: 


 











PT  11.9 SECONDS (9.7-12.2)   04/03/17  06:46    


 


INR  1.1   04/03/17  06:46    


 


APTT  34 SECONDS (21-34)   04/03/17  06:46    














Attending/Attestation





- Attestation


I have personally seen and examined this patient.: Yes


I have fully participated in the care of the patient.: Yes


I have reviewed all pertinent clinical information, including history, physical 

exam and plan: Yes


Notes (Text): 








Patient seen and examined with the resident. 





Agree with the resident's evaluation, assessment and plan. 





Acute systolic CHF exacerbation


-Pt stopped home medications for nearly 2 weeks because the diuretic causes him 

to urinate frequently, and this becomes cumbersome for him ("wanted to see how 

I would do without the medications")

## 2017-04-04 VITALS
TEMPERATURE: 98 F | HEART RATE: 79 BPM | SYSTOLIC BLOOD PRESSURE: 100 MMHG | DIASTOLIC BLOOD PRESSURE: 62 MMHG | OXYGEN SATURATION: 99 %

## 2017-04-04 RX ADMIN — METOPROLOL SUCCINATE SCH MG: 50 TABLET, EXTENDED RELEASE ORAL at 10:13

## 2017-04-04 NOTE — CP.PCM.PN
<Hung Timmons - Last Filed: 04/04/17 13:46>





Subjective





- Date & Time of Evaluation


Date of Evaluation: 04/04/17


Time of Evaluation: 12:00





- Subjective


Subjective: 


Cardiology Note- Dr. Bojorquez' service





Patient was seen and examined at bedside. Patient reports no acute complaints 

overnight. No events overnight per nursing. Current plan is discharge after 

getting the lifevest and discharge home, for followup with Dr. Bojorquez later 

for planning of ICD placement. 





Objective





- Vital Signs/Intake and Output


Vital Signs (last 24 hours): 


 











Temp Pulse Resp BP Pulse Ox


 


 97.6 F   82   20   113/77   96 


 


 04/04/17 07:10  04/04/17 11:59  04/04/17 07:10  04/04/17 13:17  04/04/17 07:10











- Medications


Medications: 


 Current Medications





Aspirin (Aspirin)  325 mg PO DAILY [facility]


   Last Admin: 04/04/17 10:13 Dose:  325 mg


Enoxaparin Sodium (Lovenox)  40 mg SC DAILY [facility]


   Last Admin: 04/04/17 10:13 Dose:  40 mg


Famotidine (Pepcid)  20 mg PO BID [facility]


   Last Admin: 04/04/17 10:13 Dose:  20 mg


Furosemide (Lasix)  40 mg PO DAILY [facility]


   Last Admin: 04/04/17 13:17 Dose:  40 mg


Losartan Potassium (Cozaar)  50 mg PO DAILY [facility]


   Last Admin: 04/04/17 10:13 Dose:  50 mg


Metoprolol Succinate (Toprol Xl)  50 mg PO Q12 [facility]


   Last Admin: 04/04/17 10:13 Dose:  50 mg


Rosuvastatin Calcium (Crestor)  10 mg PO HS [facility]


   Last Admin: 04/03/17 21:43 Dose:  10 mg











- Labs


Labs: 


 











PT  11.9 SECONDS (9.7-12.2)   04/03/17  06:46    


 


INR  1.1   04/03/17  06:46    


 


APTT  34 SECONDS (21-34)   04/03/17  06:46    














- Constitutional


Appears: Non-toxic, No Acute Distress





- Head Exam


Head Exam: ATRAUMATIC, NORMAL INSPECTION, NORMOCEPHALIC





- Eye Exam


Pupil Exam: NORMAL ACCOMODATION, PERRL





- ENT Exam


ENT Exam: Mucous Membranes Moist





- Respiratory Exam


Respiratory Exam: Clear to Ausculation Bilateral, NORMAL BREATHING PATTERN.  

absent: Prolonged Expiratory Phase, Rales, Rhonchi, Wheezes





- Cardiovascular Exam


Cardiovascular Exam: REGULAR RHYTHM, +S1, +S2





- Extremities Exam


Extremities Exam: Normal Capillary Refill, Normal Inspection





- Neurological Exam


Neurological Exam: Alert, Awake, Oriented x3





- Psychiatric Exam


Psychiatric exam: Normal Affect, Normal Mood





- Skin


Skin Exam: Dry, Intact, Normal Color, Warm





Assessment and Plan





- Assessment and Plan (Free Text)


Assessment: 


Acute on Chronic CHF


Non ischemic dilated cardiomyopathy





Continue meds:


Lasix 40mg IVP Q12


Losartan 50mg PO daily


ASA 81mg PO daily


Crestor 10mg PO HS


Metoprolol Succinate 50mg Q12h





Continue full anticoagulation





Will attempt to obtain records from [facility] [place] in regards to echos/ caths previously 

done.


First 3 ROMIs negative


DEBRA this AM positive at 0.5040





Per Dr. Lazcano, cardiac cath records from 2013 indicate that his coronaries were 

patent and functional, so no cath will be needed at this time.


Called Dr. Nonoan, he is okay with us proceeding with treating his patient, 

implant ICD if necessary.








Planning for LifeVest today.


Will discuss case with Dr. Bojorquez





<Chet Bojorquez - Last Filed: 04/05/17 09:54>





Objective





- Vital Signs/Intake and Output


Vital Signs (last 24 hours): 


 











Temp Pulse Resp BP Pulse Ox


 


 98 F   79   20   100/62   99 


 


 04/04/17 15:53  04/04/17 15:53  04/04/17 15:53  04/04/17 15:53  04/04/17 15:53











- Labs


Labs: 


 











PT  11.9 SECONDS (9.7-12.2)   04/03/17  06:46    


 


INR  1.1   04/03/17  06:46    


 


APTT  34 SECONDS (21-34)   04/03/17  06:46    














Attending/Attestation





- Attestation


I have personally seen and examined this patient.: Yes


I have fully participated in the care of the patient.: Yes


I have reviewed all pertinent clinical information, including history, physical 

exam and plan: Yes


Notes (Text): 





04/05/17 09:53


Pt will get vest today plan for ICD Thursday outpt

## 2017-04-04 NOTE — CARD
--------------- APPROVED REPORT --------------





EXAM: Two-dimensional and M-mode echocardiogram with Doppler and 

color Doppler.



Other Information 

Quality : GoodRhythm : 



INDICATION

Abnormal EKG/Arrhythmia Congestive Heart Failure 



RISK FACTORS

Hypertension 

Hyperlipidemia



M-Mode DIMENSIONS 

RVDd0.78   (2.1-3.2cm)Left Atrium (MM)4.21   (2.5-4.0cm)

IVSd0.91   (0.7-1.1cm)Aortic Root3.47   (2.2-3.7cm)

LVDd9.27   (4.0-5.6cm)Aortic Cusp Exc.1.43   (1.5-2.0cm)

PWd0.91   (0.7-1.1cm)FS (%) 12   %

LVDs8.16   (2.0-3.8cm)LVEF (%)25   (>50%)



Mitral Valve

MV E Rdbyyhjs43.0cm/sMV A Lkzlaoub28.3cm/sE/A ratio0.8



TDI

E/Lateral E'0.0E/Medial E'0.0



Tricuspid Valve

TR Peak [id number]/sTR Peak [id number]



 LEFT VENTRICLE 

The Left Ventricle is severely dilated. 

There is normal left ventricular wall thickness.

Left ventricle systolic function is severely impaired.

The Ejection Fraction is  20-25%.

There is global hypokinesis of the left ventricle. 

The left ventricular diastolic function is normal.

No left ventricle thrombus noted on this study.



 RIGHT VENTRICLE 

The right ventricle is normal size.

The right ventricular systolic function is normal.



 ATRIA 

The left atrium is mildly dilated. 

The right atrium size is normal.



 AORTIC VALVE 

The aortic valve is trileaflet.

No aortic regurgitation is present.

There is no aortic valvular stenosis on 2D.

There is no aortic valvular vegetation.



 MITRAL VALVE 

Mitral annular calcification is moderate.

There is no evidence of mitral valve prolapse.

There is no mitral valve stenosis.

Mitral regurgitation is mild to moderate.



 TRICUSPID VALVE 

The tricuspid valve is normal in structure.

There is trace to mild tricuspid regurgitation.

Right ventricular systolic pressure is estimated at less than 30 

mmHg. 

There is no pulmonary hypertension.

There is no tricuspid valve prolapse or vegetation.

There is no tricuspid valve stenosis. 



 PULMONIC VALVE 

The pulmonic valve is not well visualized.

There is mild pulmonic valvular regurgitation. 



 GREAT VESSELS 

The aortic root is normal in size.

The IVC is normal in size and collapses >50% with inspiration.



 PERICARDIAL EFFUSION 

There is no pericardial effusion.

There is no pleural effusion.



<Conclusion>

The Left Ventricle is severely dilated.

Left ventricle systolic function is severely impaired.

The Ejection Fraction is  20-25%.

There is global hypokinesis of the left ventricle.

The left atrium is mildly dilated.

The right atrium size is normal.

There is no aortic valvular stenosis on 2D.

Mitral regurgitation is mild to moderate.

There is trace to mild tricuspid regurgitation.

There is mild pulmonic valvular regurgitation.

## 2017-04-04 NOTE — CP.PCM.DIS
<Silviano Zhou - Last Filed: 04/04/17 18:59>





Provider





- Provider


Date of Admission: 


04/03/17 14:19





Attending physician: 


Bronson Song MD





Consults: 


Cardio: Dr. Lazcano


Cardio: Dr. Bojorquez





Time Spent in preparation of Discharge (in minutes): 40





Hospital Course





- Lab Results


Lab Results: 


 Most Recent Lab Values











WBC  5.4 K/uL (4.8-10.8)   04/03/17  06:46    


 


RBC  5.57 Mil/uL (4.40-5.90)   04/03/17  06:46    


 


Hgb  16.1 g/dL (12.0-18.0)   04/03/17  06:46    


 


Hct  50.2 % (35.0-51.0)   04/03/17  06:46    


 


MCV  90.2 fL (80.0-94.0)   04/03/17  06:46    


 


MCH  28.9 pg (27.0-31.0)   04/03/17  06:46    


 


MCHC  32.0 g/dL (33.0-37.0)  L  04/03/17  06:46    


 


RDW  13.6 % (11.5-14.5)   04/03/17  06:46    


 


Plt Count  199 K/uL (130-400)   04/03/17  06:46    


 


MPV  9.4 fL (7.2-11.7)   04/03/17  06:46    


 


Neut % (Auto)  37.4 % (50.0-75.0)  L  04/03/17  06:46    


 


Lymph % (Auto)  48.1 % (20.0-40.0)  H  04/03/17  06:46    


 


Mono % (Auto)  11.4 % (0.0-10.0)  H  04/03/17  06:46    


 


Eos % (Auto)  2.8 % (0.0-4.0)   04/03/17  06:46    


 


Baso % (Auto)  0.3 % (0.0-2.0)   04/03/17  06:46    


 


Neut #  2.0 K/uL (1.8-7.0)   04/03/17  06:46    


 


Lymph #  2.6 K/uL (1.0-4.3)   04/03/17  06:46    


 


Mono #  0.6 K/uL (0.0-0.8)   04/03/17  06:46    


 


Eos #  0.2 K/uL (0.0-0.7)   04/03/17  06:46    


 


Baso #  0.0 K/uL (0.0-0.2)   04/03/17  06:46    


 


PT  11.9 SECONDS (9.7-12.2)   04/03/17  06:46    


 


INR  1.1   04/03/17  06:46    


 


APTT  34 SECONDS (21-34)   04/03/17  06:46    


 


D-Dimer, Quantitative  < 200 ng/mlDDU (0-243)   04/01/17  12:38    


 


Sodium  142 mmol/L (132-148)   04/03/17  06:46    


 


Potassium  4.3 mmol/L (3.6-5.2)   04/03/17  06:46    


 


Chloride  102 mmol/L ()   04/03/17  06:46    


 


Carbon Dioxide  25 mmol/L (22-30)   04/03/17  06:46    


 


Anion Gap  19  (10-20)   04/03/17  06:46    


 


BUN  21 mg/dL (9-20)  H  04/03/17  06:46    


 


Creatinine  1.3 MG/DL (0.8-1.5)   04/03/17  06:46    


 


Est GFR ( Amer)  > 60   04/03/17  06:46    


 


Est GFR (Non-Af Amer)  59   04/03/17  06:46    


 


Random Glucose  92 mg/dL ()   04/03/17  06:46    


 


Hemoglobin A1c  5.5 % (4.2-6.5)   04/02/17  06:15    


 


Calcium  9.1 mg/dl (8.6-10.4)   04/03/17  06:46    


 


Phosphorus  4.8 mg/dL (2.5-4.5)  H  04/02/17  06:15    


 


Magnesium  2.1 mg/dL (1.6-2.3)   04/03/17  06:46    


 


Total Bilirubin  1.2 mg/dL (0.2-1.3)   04/02/17  06:15    


 


AST  43 U/L (17-59)   04/02/17  06:15    


 


ALT  31 U/L (21-72)   04/02/17  06:15    


 


Alkaline Phosphatase  60 U/L ()   04/02/17  06:15    


 


Total Creatine Kinase  249 U/L ()  H  04/04/17  00:35    


 


CK-MB (Mass)  1.69 ng/mL (0.0-3.38)   04/04/17  00:35    


 


Troponin I  0.2850 ng/mL (0.00-0.120)  H*  04/03/17  14:05    


 


Troponin I, Quant  0.2090 ng/mL (0.00-0.120)  H*  04/04/17  00:35    


 


NT-Pro-B Natriuret Pep  2370 pg/mL (0-450)  H  04/01/17  12:38    


 


Total Protein  7.2 g/dL (6.3-8.3)   04/02/17  06:15    


 


Albumin  4.0 g/dL (3.5-5.0)   04/02/17  06:15    


 


Globulin  3.2 gm/dL (2.2-3.9)   04/02/17  06:15    


 


Albumin/Globulin Ratio  1.3  (1.0-2.1)   04/02/17  06:15    


 


Triglycerides  105 mg/dL (0-149)   04/02/17  06:15    


 


Cholesterol  242 mg/dL (0-199)  H  04/02/17  06:15    


 


LDL Cholesterol Direct  159 mg/dL (0-129)  H  04/02/17  06:15    


 


HDL Cholesterol  52 mg/dL (30-70)   04/02/17  06:15    


 


Free T4  1.34 ng/dL (0.78-2.19)   04/02/17  06:15    


 


TSH 3rd Generation  3.58 mIU/L (0.46-4.68)   04/02/17  06:15    


 


Urine Color  Colorless  (YELLOW)   04/01/17  12:38    


 


Urine Clarity  Clear  (Clear)   04/01/17  12:38    


 


Urine pH  5.0  (5.0-8.0)   04/01/17  12:38    


 


Ur Specific Gravity  1.004  (1.003-1.030)   04/01/17  12:38    


 


Urine Protein  Negative mg/dL (NEGATIVE)   04/01/17  12:38    


 


Urine Glucose (UA)  Normal mg/dL (Normal)   04/01/17  12:38    


 


Urine Ketones  Negative mg/dL (NEGATIVE)   04/01/17  12:38    


 


Urine Blood  Negative  (NEGATIVE)   04/01/17  12:38    


 


Urine Nitrate  Negative  (NEGATIVE)   04/01/17  12:38    


 


Urine Bilirubin  Negative  (NEGATIVE)   04/01/17  12:38    


 


Urine Urobilinogen  Normal mg/dL (0.2-1.0)   04/01/17  12:38    


 


Ur Leukocyte Esterase  Neg Michele/uL (Negative)   04/01/17  12:38    














- Hospital Course


Hospital Course: 


Upon hospital admission: Patient is a 51year old male PMHx of CHF, HTN, asthma 

presenting with SOB for 3 days. Patient reported he did not come in earlier as 

he believed it was improving but it worsened greatly this morning as he was 

becoming short of breath upon working 10feet to the shower. Patient reported he 

saw his PMD Dr. Noonan 2 months ago and had not taken his medications for over 

a week as he didn't feel like doing so and reported they were making him 

congested. Patient also states that he had been advised regarding AICD 

placement for over a year but is still thinking about it. Patient reports this 

morning he started feeling faint as well as short of breath when walking but 

denied any acute chest pain. Patient was last seen in  in 2016 and was in the 

ICU for 24hours due to "fluid in lungs." Patient reports having to use 3 

pillows to sleep otherwise he feels short of breath. Patient denied chest pain, 

palpitations, abdominal pain, nausea, vomiting, diarrhea, constipation, urinary 

complaints, pain in his legs. Patient denied any recent travel and denied any 

sick contacts. Patient admits to nonproductive cough, unintentional weight gain 

of 5 pounds in 2 months, and orthopnea.


As per medical records, patient had a stress test 1 year ago with an EF of 20%.


PMD: Dr. Noonan


PMHx: CHF, HTN, asthma


Meds: please see list


ALL: enalapril, levofloxacin


PSurg: denies


PHospitalization: 2016 for CHF exacerbation- was in ICU for 1 day


Family Hx: denies history of MI, CVA, cancer


Social Hx: quit tobacco abuse 2012 used to smoke 1ppd for 15 years. Drinks EtOH 

socially. Denies drug abuse. Lives with ex wife's mom. Works as customer 

service.





During hospital course, the patient was evaluated and treated for the following

: (1). Acute on chronic combined systolic and diastolic congestive heart failure

: Pt stopped home medications for nearly 2 weeks because the diuretic causes 

him to urinate frequently, and this becomes cumbersome for him ("wanted to see 

how I would do without the medications"). Echo with Ef 20-25%. No thrombosis 

see full report. Troponin 3-4 Positive (Downtrending). Troponin's 1-2 Negative. 

EKG 3 - left ventricular hypertrophy; possible inferolateral ischemia. Cardio 

consult, Dr. Bojorquez - Pt previously worn a life vest before for about one 

year. Dr. Noonan is okay with team treating his patient - implant ICD if 

necessary. Cardio consult Dr. Lazcano - pt without complaints of cp or rosales.  

positive orthopnea.  repeat echo reveals dcm and ef of 12%. reviewed records at 

[facility] [place].  pt had no cad on cath in 2013. stress test last year no ischemia.    

decrease lovenox to 40 daily. Tx with Lasix 40mg IVP Q12, Losartan 50mg PO daily

, Lopressor 25mg PO BID, ASA 81mg PO daily, Crestor 10mg PO HS. CXR 4/1/17: 

cardiomegaly and mild pulmonary vascular congestion. No evidence of significant 

pleural effusion.  (2). Hypertension tx with Losartan 50mg PO daily, Lopressor 

25mg PO BID, ASA 81mg PO daily, Crestor 10mg PO HS.  (3). Asthma tx with Duoneb 

3ml INH Q6 PRN.





Upon hospital discharge, the patient was provided with the following 

instructions: Patient is stable for discharge per Dr. Song. Patient should 

resume all medications as outlined in this document. Additionally, patient 

should take the medications listed below (scripts provided). 





1. Please make an appointment and follow up with your Primary Doctor  Dr. Noonan

, within one week of discharge.


2. Please make an appointment and follow up with your Cardiologist Dr. Bojorquez within one week of discharge regarding your appointment for ICD 

evaluation and placement. Before going home today, you will also be fitted for 

and provided a Life Vest.


3. Please obtain a referral for a Pulmonologist from your PMD - for evaluation 

of Obstructive Sleep Apnea.





Patient should return to ED immediately if symptoms return or worsen.  

Instructions discussed with patient who understood and agreed.





Prescribed medications:


ASA 81mg PO daily #30


Plavix 75mg PO daily #30


Lasix 60mg PO daily #30


Losartan 50mg PO daily #30


Metoprolol Succinate XL 50mg PO Q12 #60


Simvastatin 20mg PO HS #30


Aldactone 25mg PO daily #30





This is a summary of the patient's hospital admission, see chart for 

comprehensive detail.





- Date & Time of H&P


Date of H&P: 04/01/17


Time of H&P: 14:45





Discharge Exam





- Additional Findings


Additional findings: 


- Constitutional


Appears: Non-toxic, No Acute Distress





- Head Exam


Head Exam: ATRAUMATIC, NORMAL INSPECTION, NORMOCEPHALIC





- Eye Exam


Pupil Exam: NORMAL ACCOMODATION, PERRL





- ENT Exam


ENT Exam: Mucous Membranes Moist





- Respiratory Exam


Respiratory Exam: Clear to Ausculation Bilateral, NORMAL BREATHING PATTERN.  

absent: Prolonged Expiratory Phase, Rales, Rhonchi, Wheezes





- Cardiovascular Exam


Cardiovascular Exam: REGULAR RHYTHM, +S1, +S2





- Extremities Exam


Extremities Exam: Normal Capillary Refill, Normal Inspection





- Neurological Exam


Neurological Exam: Alert, Awake, Oriented x3





- Psychiatric Exam


Psychiatric exam: Normal Affect, Normal Mood





- Skin


Skin Exam: Dry, Intact, Normal Color, Warm





Discharge Plan





- Discharge Medications


Prescriptions: 


Aspirin [Aspirin Chewable] 81 mg PO DAILY 30 Days


Clopidogrel [Plavix] 75 mg PO DAILY #30 tab


Furosemide [Lasix] 60 mg PO DAILY #30 tab


Losartan [Cozaar] 50 mg PO DAILY #30 tab


Metoprolol Succinate XL [Toprol XL] 50 mg PO Q12H 30 Days


Simvastatin 20 mg PO HS #30 tablet


Spironolactone [Aldactone] 25 mg PO DAILY 30 Days





- Follow Up Plan


Condition: STABLE


Disposition: HOME/ ROUTINE


Instructions:  Metoprolol (By mouth), Spironolactone (By mouth), Furosemide (By 

mouth), Aspirin (By mouth), Simvastatin (By mouth), Losartan (By mouth), 

Clopidogrel (By mouth), Heart Failure (DC), Heart Healthy Diet (DC)


Additional Instructions: 


Patient is stable for discharge per Dr. Song. Patient should resume all 

medications as outlined in this document. Additionally, patient should take the 

medications listed below (scripts provided). 





1. Please make an appointment and follow up with your Primary Doctor  Dr. Noonan

, within one week of discharge.


2. Please make an appointment and follow up with your Cardiologist Dr. Bojorquez within one week of discharge regarding your appointment for ICD 

evaluation and placement. Before going home today, you will also be fitted for 

and provided a Life Vest.


3. Please obtain a referral for a Pulmonologist from your PMD - for evaluation 

of Obstructive Sleep Apnea.





Patient should return to ED immediately if symptoms return or worsen.  

Instructions discussed with patient who understood and agreed.





Prescribed medications:


ASA 81mg PO daily #30


Plavix 75mg PO daily #30


Lasix 60mg PO daily #30


Losartan 50mg PO daily #30


Metoprolol Succinate XL 50mg PO Q12 #60


Simvastatin 20mg PO HS #30


Aldactone 25mg PO daily #30





Referrals: 


Chet Bojorquez MD [Staff Provider] - 


Jean-Pierre Noonan MD [Staff Provider] - 





Clinical Quality Measures





- CQM - Heart Failure


Ejection Fraction: Less Than 40 %


Left Ventricular Function to be assessed after discharge: Yes


ACE Inhibitor Prescribed: No


Contraindication/Reason for not providing: ARB used


Beta-Blocker Prescribed: Metoprolol Succinate


Angiotensin II Receptor Blocker Prescribed: Yes


AnticoagulationTherapy for Atrial Fibrillation/Atrialflutter: No


Contraindication/Reason for not providing: No medically warranted


Aldosterone Antagonist Prescribed: No


Contraindication/Reason for not providing: No medically warranted


Hydralazine Nitrate Prescribed: No


Contraindication/Reason for not providing: No medically warranted


Implantable Cardioverter Defibrillator Therapy: Yes


Contraindication/Reason for not providing: proposed


Cardiac Resynchronization Therapy Prescribed: Yes


Contraindication/Reason for not providing: proposed


Will be discharged to: Home


Follow Up Date (must be within 7 days from discharge): 04/10/17


Follow Up Time: 09:00





<Bronson Song - Last Filed: 05/09/17 13:34>





Provider





- Provider


Date of Admission: 


04/03/17 14:19





Attending physician: 


Bronson Song MD








Hospital Course





- Lab Results


Lab Results: 


 Most Recent Lab Values











WBC  5.4 K/uL (4.8-10.8)   04/03/17  06:46    


 


RBC  5.57 Mil/uL (4.40-5.90)   04/03/17  06:46    


 


Hgb  16.1 g/dL (12.0-18.0)   04/03/17  06:46    


 


Hct  50.2 % (35.0-51.0)   04/03/17  06:46    


 


MCV  90.2 fL (80.0-94.0)   04/03/17  06:46    


 


MCH  28.9 pg (27.0-31.0)   04/03/17  06:46    


 


MCHC  32.0 g/dL (33.0-37.0)  L  04/03/17  06:46    


 


RDW  13.6 % (11.5-14.5)   04/03/17  06:46    


 


Plt Count  199 K/uL (130-400)   04/03/17  06:46    


 


MPV  9.4 fL (7.2-11.7)   04/03/17  06:46    


 


Neut % (Auto)  37.4 % (50.0-75.0)  L  04/03/17  06:46    


 


Lymph % (Auto)  48.1 % (20.0-40.0)  H  04/03/17  06:46    


 


Mono % (Auto)  11.4 % (0.0-10.0)  H  04/03/17  06:46    


 


Eos % (Auto)  2.8 % (0.0-4.0)   04/03/17  06:46    


 


Baso % (Auto)  0.3 % (0.0-2.0)   04/03/17  06:46    


 


Neut #  2.0 K/uL (1.8-7.0)   04/03/17  06:46    


 


Lymph #  2.6 K/uL (1.0-4.3)   04/03/17  06:46    


 


Mono #  0.6 K/uL (0.0-0.8)   04/03/17  06:46    


 


Eos #  0.2 K/uL (0.0-0.7)   04/03/17  06:46    


 


Baso #  0.0 K/uL (0.0-0.2)   04/03/17  06:46    


 


PT  11.9 SECONDS (9.7-12.2)   04/03/17  06:46    


 


INR  1.1   04/03/17  06:46    


 


APTT  34 SECONDS (21-34)   04/03/17  06:46    


 


D-Dimer, Quantitative  < 200 ng/mlDDU (0-243)   04/01/17  12:38    


 


Sodium  142 mmol/L (132-148)   04/03/17  06:46    


 


Potassium  4.3 mmol/L (3.6-5.2)   04/03/17  06:46    


 


Chloride  102 mmol/L ()   04/03/17  06:46    


 


Carbon Dioxide  25 mmol/L (22-30)   04/03/17  06:46    


 


Anion Gap  19  (10-20)   04/03/17  06:46    


 


BUN  21 mg/dL (9-20)  H  04/03/17  06:46    


 


Creatinine  1.3 MG/DL (0.8-1.5)   04/03/17  06:46    


 


Est GFR ( Amer)  > 60   04/03/17  06:46    


 


Est GFR (Non-Af Amer)  59   04/03/17  06:46    


 


Random Glucose  92 mg/dL ()   04/03/17  06:46    


 


Hemoglobin A1c  5.5 % (4.2-6.5)   04/02/17  06:15    


 


Calcium  9.1 mg/dl (8.6-10.4)   04/03/17  06:46    


 


Phosphorus  4.8 mg/dL (2.5-4.5)  H  04/02/17  06:15    


 


Magnesium  2.1 mg/dL (1.6-2.3)   04/03/17  06:46    


 


Total Bilirubin  1.2 mg/dL (0.2-1.3)   04/02/17  06:15    


 


AST  43 U/L (17-59)   04/02/17  06:15    


 


ALT  31 U/L (21-72)   04/02/17  06:15    


 


Alkaline Phosphatase  60 U/L ()   04/02/17  06:15    


 


Total Creatine Kinase  249 U/L ()  H  04/04/17  00:35    


 


CK-MB (Mass)  1.69 ng/mL (0.0-3.38)   04/04/17  00:35    


 


Troponin I  0.2850 ng/mL (0.00-0.120)  H*  04/03/17  14:05    


 


Troponin I, Quant  0.2090 ng/mL (0.00-0.120)  H*  04/04/17  00:35    


 


NT-Pro-B Natriuret Pep  2370 pg/mL (0-450)  H  04/01/17  12:38    


 


Total Protein  7.2 g/dL (6.3-8.3)   04/02/17  06:15    


 


Albumin  4.0 g/dL (3.5-5.0)   04/02/17  06:15    


 


Globulin  3.2 gm/dL (2.2-3.9)   04/02/17  06:15    


 


Albumin/Globulin Ratio  1.3  (1.0-2.1)   04/02/17  06:15    


 


Triglycerides  105 mg/dL (0-149)   04/02/17  06:15    


 


Cholesterol  242 mg/dL (0-199)  H  04/02/17  06:15    


 


LDL Cholesterol Direct  159 mg/dL (0-129)  H  04/02/17  06:15    


 


HDL Cholesterol  52 mg/dL (30-70)   04/02/17  06:15    


 


Free T4  1.34 ng/dL (0.78-2.19)   04/02/17  06:15    


 


TSH 3rd Generation  3.58 mIU/L (0.46-4.68)   04/02/17  06:15    


 


Urine Color  Colorless  (YELLOW)   04/01/17  12:38    


 


Urine Clarity  Clear  (Clear)   04/01/17  12:38    


 


Urine pH  5.0  (5.0-8.0)   04/01/17  12:38    


 


Ur Specific Gravity  1.004  (1.003-1.030)   04/01/17  12:38    


 


Urine Protein  Negative mg/dL (NEGATIVE)   04/01/17  12:38    


 


Urine Glucose (UA)  Normal mg/dL (Normal)   04/01/17  12:38    


 


Urine Ketones  Negative mg/dL (NEGATIVE)   04/01/17  12:38    


 


Urine Blood  Negative  (NEGATIVE)   04/01/17  12:38    


 


Urine Nitrate  Negative  (NEGATIVE)   04/01/17  12:38    


 


Urine Bilirubin  Negative  (NEGATIVE)   04/01/17  12:38    


 


Urine Urobilinogen  Normal mg/dL (0.2-1.0)   04/01/17  12:38    


 


Ur Leukocyte Esterase  Neg Michele/uL (Negative)   04/01/17  12:38    














Attending/Attestation





- Attestation


I have personally seen and examined this patient.: Yes


I have fully participated in the care of the patient.: Yes


I have reviewed all pertinent clinical information, including history, physical 

exam and plan: Yes


Notes (Text): 








Patient seen and examined with the resident. 





Agree with the resident's evaluation, assessment and plan. 





 Acute on chronic combined systolic and diastolic congestive heart failure

## 2017-04-04 NOTE — CARD
--------------- APPROVED REPORT --------------





EKG Measurement

Heart [id number]

NH [id number]

[id number]

[id number]

[id number]



<Conclusion>

Normal sinus rhythm

Possible Left atrial enlargement

Left ventricular hypertrophy

ST & T wave abnormality, consider inferolateral ischemia

Prolonged QT

Abnormal ECG

## 2017-04-04 NOTE — PCM.HF
Heart Failure Core Measure





- Heart Failure


Ejection Fraction: Less Than 40 % (LVEF 12%)


ACE Inhibitor Prescribed: No


Contraindication/Reason for not providing: ARB


Beta-Blocker Prescribed: Metoprolol Succinate


Angiotensin II Receptor Blocker Prescribed: Yes


AnticoagulationTherapy for Atrial Fibrillation/Atrialflutter: No


Contraindication/Reason for not providing: NO AFIB


Aldosterone Antagonist Prescribed: No


Contraindication/Reason for not providing: RENAL DYSFUNCTION 


Hydralazine Nitrate Prescribed: No


Contraindication/Reason for not providing: NOT RX BY MD 


Implantable Cardioverter Defibrillator Therapy: No


Contraindication/Reason for not providing: POSSIBLE AICD WITH DR RAMOS NEXT 

WEEK 


Cardiac Resynchronization Therapy Prescribed: No


Contraindication/Reason for not providing: POSSIBLE AICD WITH DR RAMOS NEXT 

WEEK 





- Follow up


Will be discharged to: Home


Follow Up Date (must be within 7 days from discharge): 04/06/17


Follow Up Time: 09:00

## 2017-04-05 NOTE — CARD
--------------- APPROVED REPORT --------------





EKG Measurement

Heart [id number]

RI 194P52

[id number]

[id number]

[id number]



<Conclusion>

Normal sinus rhythm

Biatrial enlargement

Left ventricular hypertrophy

ST & T wave abnormality, consider inferolateral ischemia

Abnormal ECG

## 2017-04-06 NOTE — CARD
--------------- APPROVED REPORT --------------





EKG Measurement

Heart [id number]

ND 174P52

[id number]

[id number]

[id number]



<Conclusion>

Sinus tachycardia

Possible Left atrial enlargement

Left axis deviation

Left ventricular hypertrophy

T wave abnormality, consider lateral ischemia

Abnormal ECG

## 2018-07-24 ENCOUNTER — HOSPITAL ENCOUNTER (OUTPATIENT)
Dept: HOSPITAL 31 - C.ER | Age: 53
Setting detail: OBSERVATION
LOS: 2 days | Discharge: HOME | End: 2018-07-26
Attending: INTERNAL MEDICINE | Admitting: INTERNAL MEDICINE
Payer: MEDICAID

## 2018-07-24 VITALS — BODY MASS INDEX: 29.4 KG/M2

## 2018-07-24 DIAGNOSIS — I11.0: Primary | ICD-10-CM

## 2018-07-24 DIAGNOSIS — Z91.19: ICD-10-CM

## 2018-07-24 DIAGNOSIS — I42.0: ICD-10-CM

## 2018-07-24 DIAGNOSIS — I50.9: ICD-10-CM

## 2018-07-24 DIAGNOSIS — Z87.891: ICD-10-CM

## 2018-07-24 DIAGNOSIS — E78.00: ICD-10-CM

## 2018-07-24 DIAGNOSIS — Z95.0: ICD-10-CM

## 2018-07-24 LAB
ALBUMIN SERPL-MCNC: 4.7 [, G/DL] (ref 3.5–5)
ALBUMIN/GLOB SERPL: 1.4 [,] (ref 1–2.1)
ALT SERPL-CCNC: 37 [, U/L] (ref 21–72)
APTT BLD: 34 [, SECONDS] (ref 21–34)
AST SERPL-CCNC: 50 [, U/L] (ref 17–59)
BASOPHILS # BLD AUTO: 0 [, K/UL] (ref 0–0.2)
BASOPHILS NFR BLD: 0.4 [, %] (ref 0–2)
BNP SERPL-MCNC: 2390 [, PG/ML] (ref 0–900)
BUN SERPL-MCNC: 13 [, MG/DL] (ref 9–20)
CALCIUM SERPL-MCNC: 9.8 [, MG/DL] (ref 8.6–10.4)
EOSINOPHIL # BLD AUTO: 0.1 [, K/UL] (ref 0–0.7)
EOSINOPHIL NFR BLD: 2 [, %] (ref 0–4)
ERYTHROCYTE [DISTWIDTH] IN BLOOD BY AUTOMATED COUNT: 13.8 [, %] (ref 11.5–14.5)
GFR NON-AFRICAN AMERICAN: > 60 [,]
HGB BLD-MCNC: 15.9 [, G/DL] (ref 12–18)
INR PPP: 1.1 [,]
LYMPHOCYTES # BLD AUTO: 1.3 [, K/UL] (ref 1–4.3)
LYMPHOCYTES NFR BLD AUTO: 31.8 [, %] (ref 20–40)
MCH RBC QN AUTO: 30 [, PG] (ref 27–31)
MCHC RBC AUTO-ENTMCNC: 33.2 [, G/DL] (ref 33–37)
MCV RBC AUTO: 90.4 [, FL] (ref 80–94)
MONOCYTES # BLD: 0.4 [, K/UL] (ref 0–0.8)
MONOCYTES NFR BLD: 9.6 [, %] (ref 0–10)
NEUTROPHILS # BLD: 2.3 [, K/UL] (ref 1.8–7)
NEUTROPHILS NFR BLD AUTO: 56.2 [, %] (ref 50–75)
NRBC BLD AUTO-RTO: 0.3 [, %] (ref 0–2)
PLATELET # BLD: 228 [, K/UL] (ref 130–400)
PMV BLD AUTO: 8.2 [, FL] (ref 7.2–11.7)
PROTHROMBIN TIME: 12 [, SECONDS] (ref 9.7–12.2)
RBC # BLD AUTO: 5.29 [, MIL/UL] (ref 4.4–5.9)
WBC # BLD AUTO: 4.1 [, K/UL] (ref 4.8–10.8)

## 2018-07-24 PROCEDURE — 85730 THROMBOPLASTIN TIME PARTIAL: CPT

## 2018-07-24 PROCEDURE — 85025 COMPLETE CBC W/AUTO DIFF WBC: CPT

## 2018-07-24 PROCEDURE — 36415 COLL VENOUS BLD VENIPUNCTURE: CPT

## 2018-07-24 PROCEDURE — 85610 PROTHROMBIN TIME: CPT

## 2018-07-24 PROCEDURE — 84484 ASSAY OF TROPONIN QUANT: CPT

## 2018-07-24 PROCEDURE — 93005 ELECTROCARDIOGRAM TRACING: CPT

## 2018-07-24 PROCEDURE — 71045 X-RAY EXAM CHEST 1 VIEW: CPT

## 2018-07-24 PROCEDURE — 99285 EMERGENCY DEPT VISIT HI MDM: CPT

## 2018-07-24 PROCEDURE — 83880 ASSAY OF NATRIURETIC PEPTIDE: CPT

## 2018-07-24 PROCEDURE — 96374 THER/PROPH/DIAG INJ IV PUSH: CPT

## 2018-07-24 PROCEDURE — 80053 COMPREHEN METABOLIC PANEL: CPT

## 2018-07-24 NOTE — C.PDOC
History Of Present Illness


52-year-old male, PMHx includes CHF, presents to the emergency department with 

complaints of shortness of breath and chest pain. Patient denies any nausea/

vomiting.


Time Seen by Provider: 18 16:23


Chief Complaint (Nursing): Chest Pain


History Per: Patient


History/Exam Limitations: no limitations


Onset/Duration Of Symptoms: Days


Current Symptoms Are (Timing): Still Present


Severity: Moderate





Past Medical History


Reviewed: Historical Data, Nursing Documentation, Vital Signs


Vital Signs: 


 Last Vital Signs











Temp  98.2 F   18 17:43


 


Pulse  60   18 17:43


 


Resp  18   18 17:43


 


BP  127/85   18 17:43


 


Pulse Ox  97   18 18:28














- Medical History


PMH: CHF, HTN, Hypercholesterolemia, Hyperthyroidism, Pneumonia


   Denies: Chronic Kidney Disease


Surgical History: Pacemaker


Family History: States: No Known Family Hx





- Social History


Hx Alcohol Use: Yes


Hx Substance Use: No





- Immunization History


Hx Tetanus Toxoid Vaccination:  (unk)


Hx Influenza Vaccination:  (unk)


Hx Pneumococcal Vaccination: Yes





Review Of Systems


Constitutional: Negative for: Fever, Chills


Cardiovascular: Positive for: Chest Pain.  Negative for: Palpitations


Respiratory: Positive for: Shortness of Breath


Gastrointestinal: Negative for: Nausea, Vomiting


Musculoskeletal: Negative for: Back Pain


Neurological: Negative for: Weakness





Physical Exam





- Physical Exam


Appears: Non-toxic, No Acute Distress


Skin: Normal Color, Warm, Dry, No Rash


Head: Atraumatic, Normacephalic


Eye(s): bilateral: Normal Inspection, PERRL, EOMI


Nose: Normal


Oral Mucosa: Moist


Lips: Normal Appearing


Neck: Normal ROM


Chest: Symmetrical


Cardiovascular: Rhythm Regular, No Murmur


Respiratory: Decreased Breath Sounds (B/L), No Accessory Muscle Use


Gastrointestinal/Abdominal: Soft, No Tenderness, No Guarding, No Rebound


Back: Normal Inspection


Extremity: Normal ROM, No Deformity, No Swelling


Neurological/Psych: Oriented x3, Normal Speech





ED Course And Treatment





- Laboratory Results


Result Diagrams: 


 18 16:53





 18 16:53


ECG: Interpreted By Me, Viewed By Me


ECG Rhythm: Sinus Rhythm


Interpretation Of ECG: T wave inversion: 1, AVL, V4, V5 and V6.


Rate From EC


O2 Sat by Pulse Oximetry: 97


Pulse Ox Interpretation: Normal (RA)





Medical Decision Making


Medical Decision Making: 





chf - discussed with Dr. Noonan and would like to admit to tele observation


Dr. POP Kurtz notified for admission 





Disposition


Discussed With : Bijal Kurtz


Doctor Will See Patient In The: Hospital


Counseled Patient/Family Regarding: Studies Performed, Diagnosis





- Disposition


Disposition: HOSPITALIZED


Disposition Time: 18:53


Condition: FAIR


Forms:  [facility] (English)





- Clinical Impression


Clinical Impression: 


 CHF (congestive heart failure)








- Scribe Statement


The provider has reviewed the documentation as recorded by the Scribe (Donte Lira)








All medical record entries made by the Scribe were at my direction and 

personally dictated by me. I have reviewed the chart and agree that the record 

accurately reflects my personal performance of the history, physical exam, 

medical decision making, and the department course for this patient. I have 

also personally directed, reviewed, and agree with the discharge instructions 

and disposition.

## 2018-07-24 NOTE — RAD
Date of service: 



07/24/2018



PROCEDURE:  CHEST RADIOGRAPH, 1 VIEW



HISTORY:

SOB



COMPARISON:

Chest radiograph dated 11/04/2017.



FINDINGS:



LUNGS:

Clear.



PLEURA:

No pneumothorax or pleural fluid seen.



CARDIOVASCULAR:

And left subclavian access AICD/ pacemaker redemonstrated.  

Cardiomediastinal silhouette stably enlarged.



OSSEOUS STRUCTURES:

Unchanged.



VISUALIZED UPPER ABDOMEN:

Normal.



OTHER FINDINGS:

None. 



IMPRESSION:

No active disease.

## 2018-07-25 LAB
CK MB SERPL-MCNC: 0.81 [, NG/ML] (ref 0–3.38)
CK MB SERPL-MCNC: 0.86 [, NG/ML] (ref 0–3.38)
TROPONIN I SERPL-MCNC: 0.04 [, NG/ML] (ref 0–0.12)
TROPONIN I SERPL-MCNC: 0.04 [, NG/ML] (ref 0–0.12)

## 2018-07-25 RX ADMIN — METOPROLOL SUCCINATE SCH MG: 25 TABLET, EXTENDED RELEASE ORAL at 17:01

## 2018-07-25 RX ADMIN — ENOXAPARIN SODIUM SCH MG: 40 INJECTION SUBCUTANEOUS at 10:20

## 2018-07-25 RX ADMIN — METOPROLOL SUCCINATE SCH MG: 25 TABLET, EXTENDED RELEASE ORAL at 10:18

## 2018-07-25 NOTE — CP.PCM.HP
History of Present Illness





- History of Present Illness


History of Present Illness: 





pt wakeduring the night with chest pain presser across his chest 2 days in arow 

came to er 





Present on Admission





- Present on Admission


Any Indicators Present on Admission: No





Past Patient History





- Infectious Disease


Hx of Infectious Diseases: None





- Past Medical History & Family History


Past Medical History?: Yes





- Past Social History


Smoking Status: Former Smoker





- CARDIAC


Hx Congestive Heart Failure: Yes


Hx Hypercholesterolemia: Yes


Hx Hypertension: Yes


Hx Pacemaker: Yes





- PULMONARY


Hx Pneumonia: Yes





- HEENT


Other/Comment: wears reading glasses





- RENAL


Hx Chronic Kidney Disease: No





- ENDOCRINE/METABOLIC


Hx Hyperthyroidism: Yes





- MUSCULOSKELETAL/RHEUMATOLOGICAL


Hx Falls: No





- PSYCHIATRIC


Hx Substance Use: No





- SURGICAL HISTORY


Hx Surgeries: Yes


Other/Comment: [facility] chest 06/2017





- ANESTHESIA


Hx Anesthesia: Yes


Hx Anesthesia Reactions: No





Meds


Allergies/Adverse Reactions: 


 Allergies











Allergy/AdvReac Type Severity Reaction Status Date / Time


 


enalapril maleate Allergy Intermediate SHORTNESS Verified 07/24/18 15:57





[From Vasotec]   OF BREATH  


 


enalaprilat dihydrate Allergy Intermediate SWELLING Verified 07/24/18 15:57





[From Vasotec]     


 


levofloxacin Allergy Unknown SHORTNESS Verified 07/24/18 15:57





   OF BREATH  


 


iodine Allergy   Verified 07/24/18 15:57














Physical Exam





- Constitutional


Appears: Non-toxic





- Head Exam


Head Exam: ATRAUMATIC





- Eye Exam


Eye Exam: Normal appearance


Pupil Exam: NORMAL ACCOMODATION





- ENT Exam


ENT Exam: Mucous Membranes Moist





- Neck Exam


Neck exam: Positive for: Full Rom





- Respiratory Exam


Respiratory Exam: Decreased Breath Sounds





- Cardiovascular Exam


Cardiovascular Exam: REGULAR RHYTHM


Additional comments: 





*  K K





- GI/Abdominal Exam


GI & Abdominal Exam: Normal Bowel Sounds





- Rectal Exam


Rectal Exam: Deferred





- Extremities Exam


Extremities exam: Positive for: normal inspection





- Back Exam


Back exam: NORMAL INSPECTION





- Neurological Exam


Neurological exam: Alert, Normal Gait, Oriented x3





- Psychiatric Exam


Psychiatric exam: Normal Affect





- Skin


Skin Exam: Normal Color





Results





- Vital Signs


Recent Vital Signs: 





 Last Vital Signs











Temp  97 F L  07/25/18 07:00


 


Pulse  72   07/25/18 07:00


 


Resp  20   07/25/18 07:00


 


BP  119/82   07/25/18 10:18


 


Pulse Ox  98   07/25/18 07:00














- Labs


Result Diagrams: 


 07/24/18 16:53





 07/24/18 16:53


Labs: 





 Laboratory Results - last 24 hr











  07/24/18 07/24/18 07/24/18





  16:53 16:53 16:53


 


WBC  4.1 L  


 


RBC  5.29  


 


Hgb  15.9  


 


Hct  47.9  


 


MCV  90.4  


 


MCH  30.0  


 


MCHC  33.2  


 


RDW  13.8  


 


Plt Count  228  


 


MPV  8.2  


 


Neut % (Auto)  56.2  


 


Lymph % (Auto)  31.8  


 


Mono % (Auto)  9.6  


 


Eos % (Auto)  2.0  


 


Baso % (Auto)  0.4  


 


Neut # (Auto)  2.3  


 


Lymph # (Auto)  1.3  


 


Mono # (Auto)  0.4  


 


Eos # (Auto)  0.1  


 


Baso # (Auto)  0.0  


 


PT   12.0 


 


INR   1.1 


 


APTT   34 


 


Sodium    141


 


Potassium    5.0


 


Chloride    102


 


Carbon Dioxide    28


 


Anion Gap    16


 


BUN    13


 


Creatinine    1.1


 


Est GFR ( Amer)    > 60


 


Est GFR (Non-Af Amer)    > 60


 


Random Glucose    98


 


Calcium    9.8


 


Total Bilirubin    1.1


 


AST    50


 


ALT    37


 


Alkaline Phosphatase    78


 


Total Creatine Kinase   


 


CK-MB (Mass)   


 


Troponin I    0.0450


 


NT-Pro-B Natriuret Pep    2390 H


 


Total Protein    8.0


 


Albumin    4.7


 


Globulin    3.4


 


Albumin/Globulin Ratio    1.4














  07/25/18 07/25/18





  01:02 09:29


 


WBC  


 


RBC  


 


Hgb  


 


Hct  


 


MCV  


 


MCH  


 


MCHC  


 


RDW  


 


Plt Count  


 


MPV  


 


Neut % (Auto)  


 


Lymph % (Auto)  


 


Mono % (Auto)  


 


Eos % (Auto)  


 


Baso % (Auto)  


 


Neut # (Auto)  


 


Lymph # (Auto)  


 


Mono # (Auto)  


 


Eos # (Auto)  


 


Baso # (Auto)  


 


PT  


 


INR  


 


APTT  


 


Sodium  


 


Potassium  


 


Chloride  


 


Carbon Dioxide  


 


Anion Gap  


 


BUN  


 


Creatinine  


 


Est GFR ( Amer)  


 


Est GFR (Non-Af Amer)  


 


Random Glucose  


 


Calcium  


 


Total Bilirubin  


 


AST  


 


ALT  


 


Alkaline Phosphatase  


 


Total Creatine Kinase  370 H  335 H


 


CK-MB (Mass)  0.81  0.86


 


Troponin I  0.0400  0.0430


 


NT-Pro-B Natriuret Pep  


 


Total Protein  


 


Albumin  


 


Globulin  


 


Albumin/Globulin Ratio  














Assessment & Plan





- Assessment and Plan (Free Text)


Assessment: 





acute chf htn 


Plan: 





dr correa consult cotinu as per orders





- Date & Time


Date: 07/25/18


Time: 10:45

## 2018-07-25 NOTE — CP.PCM.CON
History of Present Illness





- History of Present Illness


History of Present Illness: 





52 years old with dilated cardiomyopathy, nonischemic, on medical treatment 

that included ACE inhibitor and beta blocker in addition to an implanted 

defibrillator. Was doing well yet lost his job noncompliant with fluid 

restriction and salt restriction was admitted yet again with shortness of 

breath. Needs diuresis, continue medical treatment, more education about daily 

weight and fluid restriction





Review of Systems





- Constitutional


Constitutional: Anorexia, Weakness





- EENT


Eyes: absent: Discharge


Ears: absent: Ear Discharge, Dizziness


Nose/Mouth/Throat: absent: Epistaxis





- Cardiovascular


Cardiovascular: Dyspnea, Leg Edema, Orthopnea.  absent: Acrocyanosis, Chest Pain

, Diaphoresis, Syncope





- Respiratory


Respiratory: Cough, Dyspnea.  absent: Hemoptysis, Stridor





- Gastrointestinal


Gastrointestinal: absent: Abdominal Pain, Constipation, Cramping, Diarrhea, 

Vomiting





- Genitourinary


Genitourinary: absent: Change in Urinary Stream





Past Patient History





- Infectious Disease


Hx of Infectious Diseases: None





- Past Medical History & Family History


Past Medical History?: Yes





- Past Social History


Smoking Status: Former Smoker





- CARDIAC


Hx Congestive Heart Failure: Yes


Hx Hypercholesterolemia: Yes


Hx Hypertension: Yes


Hx Pacemaker: Yes





- PULMONARY


Hx Pneumonia: Yes





- HEENT


Other/Comment: wears reading glasses





- RENAL


Hx Chronic Kidney Disease: No





- ENDOCRINE/METABOLIC


Hx Hyperthyroidism: Yes





- MUSCULOSKELETAL/RHEUMATOLOGICAL


Hx Falls: No





- PSYCHIATRIC


Hx Substance Use: No





- SURGICAL HISTORY


Hx Surgeries: Yes


Other/Comment: [place] chest 06/2017





- ANESTHESIA


Hx Anesthesia: Yes


Hx Anesthesia Reactions: No





Meds


Allergies/Adverse Reactions: 


 Allergies











Allergy/AdvReac Type Severity Reaction Status Date / Time


 


enalapril maleate Allergy Intermediate SHORTNESS Verified 07/24/18 15:57





[From Vasotec]   OF BREATH  


 


enalaprilat dihydrate Allergy Intermediate SWELLING Verified 07/24/18 15:57





[From Vasotec]     


 


levofloxacin Allergy Unknown SHORTNESS Verified 07/24/18 15:57





   OF BREATH  


 


iodine Allergy   Verified 07/24/18 15:57














- Medications


Medications: 


 Current Medications





Albuterol (Ventolin Hfa 90 Mcg/Actuation (8 G))  2 puff IH RQ6 PRN


   PRN Reason: wheezing/shortness of breath


Aspirin (Aspirin Chewable)  81 mg PO DAILY [facility]


   Last Admin: 07/25/18 10:18 Dose:  81 mg


Enoxaparin Sodium (Lovenox)  40 mg SC DAILY [facility]


   Last Admin: 07/25/18 10:20 Dose:  40 mg


Furosemide (Lasix)  40 mg PO DAILY [facility]


   Last Admin: 07/25/18 10:18 Dose:  40 mg


Losartan Potassium (Cozaar)  50 mg PO DAILY [facility]


   Last Admin: 07/25/18 10:18 Dose:  50 mg


Metoprolol Succinate (Toprol Xl)  25 mg PO BID [facility]


   Last Admin: 07/25/18 10:18 Dose:  25 mg


Rosuvastatin Calcium (Crestor)  10 mg PO [facility]











Physical Exam





- Constitutional


Appears: Non-toxic





- Head Exam


Head Exam: ATRAUMATIC





- Eye Exam


Eye Exam: EOMI





- ENT Exam


ENT Exam: Mucous Membranes Moist





- Neck Exam


Neck exam: Negative for: Lymphadenopathy, Thyromegaly





- Respiratory Exam


Respiratory Exam: Clear to Auscultation Bilateral.  absent: Rales, Wheezes





- Cardiovascular Exam


Cardiovascular Exam: REGULAR RHYTHM, Systolic Murmur





- GI/Abdominal Exam


GI & Abdominal Exam: Normal Bowel Sounds.  absent: Organomegaly





- Rectal Exam


Rectal Exam: Deferred





- Extremities Exam


Extremities exam: Positive for: pedal pulses present.  Negative for: calf 

tenderness





- Neurological Exam


Neurological exam: Alert, Oriented x3





- Psychiatric Exam


Psychiatric exam: Normal Mood





Results





- Vital Signs


Recent Vital Signs: 


 Last Vital Signs











Temp  97 F L  07/25/18 07:00


 


Pulse  72   07/25/18 07:00


 


Resp  20   07/25/18 07:00


 


BP  119/82   07/25/18 10:18


 


Pulse Ox  98   07/25/18 07:00














- Labs


Result Diagrams: 


 07/24/18 16:53





 07/24/18 16:53


Labs: 


 Laboratory Results - last 24 hr











  07/24/18 07/24/18 07/24/18





  16:53 16:53 16:53


 


WBC  4.1 L  


 


RBC  5.29  


 


Hgb  15.9  


 


Hct  47.9  


 


MCV  90.4  


 


MCH  30.0  


 


MCHC  33.2  


 


RDW  13.8  


 


Plt Count  228  


 


MPV  8.2  


 


Neut % (Auto)  56.2  


 


Lymph % (Auto)  31.8  


 


Mono % (Auto)  9.6  


 


Eos % (Auto)  2.0  


 


Baso % (Auto)  0.4  


 


Neut # (Auto)  2.3  


 


Lymph # (Auto)  1.3  


 


Mono # (Auto)  0.4  


 


Eos # (Auto)  0.1  


 


Baso # (Auto)  0.0  


 


PT   12.0 


 


INR   1.1 


 


APTT   34 


 


Sodium    141


 


Potassium    5.0


 


Chloride    102


 


Carbon Dioxide    28


 


Anion Gap    16


 


BUN    13


 


Creatinine    1.1


 


Est GFR ( Amer)    > 60


 


Est GFR (Non-Af Amer)    > 60


 


Random Glucose    98


 


Calcium    9.8


 


Total Bilirubin    1.1


 


AST    50


 


ALT    37


 


Alkaline Phosphatase    78


 


Total Creatine Kinase   


 


CK-MB (Mass)   


 


Troponin I    0.0450


 


NT-Pro-B Natriuret Pep    2390 H


 


Total Protein    8.0


 


Albumin    4.7


 


Globulin    3.4


 


Albumin/Globulin Ratio    1.4














  07/25/18 07/25/18





  01:02 09:29


 


WBC  


 


RBC  


 


Hgb  


 


Hct  


 


MCV  


 


MCH  


 


MCHC  


 


RDW  


 


Plt Count  


 


MPV  


 


Neut % (Auto)  


 


Lymph % (Auto)  


 


Mono % (Auto)  


 


Eos % (Auto)  


 


Baso % (Auto)  


 


Neut # (Auto)  


 


Lymph # (Auto)  


 


Mono # (Auto)  


 


Eos # (Auto)  


 


Baso # (Auto)  


 


PT  


 


INR  


 


APTT  


 


Sodium  


 


Potassium  


 


Chloride  


 


Carbon Dioxide  


 


Anion Gap  


 


BUN  


 


Creatinine  


 


Est GFR ( Amer)  


 


Est GFR (Non-Af Amer)  


 


Random Glucose  


 


Calcium  


 


Total Bilirubin  


 


AST  


 


ALT  


 


Alkaline Phosphatase  


 


Total Creatine Kinase  370 H  335 H


 


CK-MB (Mass)  0.81  0.86


 


Troponin I  0.0400  0.0430


 


NT-Pro-B Natriuret Pep  


 


Total Protein  


 


Albumin  


 


Globulin  


 


Albumin/Globulin Ratio  














Assessment & Plan


(1) Acute on chronic combined systolic and diastolic congestive heart failure


Status: Acute   


Comment: Acute exacerbation with noncompliance with diet. Nonischemic etiology 

with prior negative stress test and echo. Continue medical treatment

## 2018-07-25 NOTE — CARD
--------------- APPROVED REPORT --------------





Date of service: 07/24/2018



EKG Measurement

Heart [id number]

WI [id number]

[id number]

[id number]

[id number]



<Conclusion>

Normal sinus rhythm

Possible Left atrial enlargement

Left ventricular hypertrophy with repolarization abnormality

Abnormal ECG

## 2018-07-26 VITALS — SYSTOLIC BLOOD PRESSURE: 127 MMHG | DIASTOLIC BLOOD PRESSURE: 56 MMHG

## 2018-07-26 VITALS — OXYGEN SATURATION: 98 %

## 2018-07-26 VITALS — TEMPERATURE: 98.1 F | HEART RATE: 56 BPM

## 2018-07-26 VITALS — RESPIRATION RATE: 20 BRPM

## 2018-07-26 RX ADMIN — ENOXAPARIN SODIUM SCH MG: 40 INJECTION SUBCUTANEOUS at 10:25

## 2018-07-26 RX ADMIN — METOPROLOL SUCCINATE SCH MG: 25 TABLET, EXTENDED RELEASE ORAL at 10:24

## 2018-07-26 NOTE — CP.PCM.PN
Subjective





- Date & Time of Evaluation


Date of Evaluation: 07/26/18


Time of Evaluation: 11:20





- Subjective


Subjective: 





Patient seen  today, no reported chest pain overnight , denies any sob, 

palpitations , dizziness, , oob ambulating the biswas way  without sob


No overnight events reported on monitor 


troponin x 3 - negative  





Objective





- Vital Signs/Intake and Output


Vital Signs (last 24 hours): 


 











Temp Pulse Resp BP Pulse Ox


 


 98.1 F   56 L  20   127/56 L  98 


 


 07/26/18 07:40  07/26/18 07:40  07/26/18 07:40  07/26/18 10:25  07/26/18 08:27








Intake and Output: 


 











 07/26/18 07/26/18





 06:59 18:59


 


Intake Total 200 


 


Balance 200 














- Labs


Labs: 


 





 07/24/18 16:53 





 07/24/18 16:53 





 











PT  12.0 SECONDS (9.7-12.2)   07/24/18  16:53    


 


INR  1.1   07/24/18  16:53    


 


APTT  34 SECONDS (21-34)   07/24/18  16:53    














Assessment and Plan





- Assessment and Plan (Free Text)


Assessment: 





A/P


 52 yr old mal ewith pmhx of CHF, HTN, Hypercholesterolemia, admitted with 

chest pain and sob 


troponin x 3 - negative 


cxr- negative 


Dr. Noonan cardiology  consulted  and seen patient -  Nonischemic etiology with 

prior negative stress test and echo and recommends to Continue medical 

treatment  


D/w Dr. Kurtz, cleared for discharge home today and f/u with Dr. Noonan 

office next week


Discharge plan discussed with patient who understands and agree with plan 


Patient instructed to returns to ED if symptoms return or any other concerning 

symptoms 


patient counselled regarding diet- low sodium and fluid restriction.


as per patient no need for RX  he has all medication at home

## 2018-07-26 NOTE — IP.NPCORE
Heart Failure Core Measure





- Heart Failure


Ejection Fraction: Less Than 40 %


ACE Inhibitor Prescribed: No


Contraindication/Reason for not providing: on ARB


Beta-Blocker Prescribed: Metoprolol Succinate


Angiotensin II Receptor Blocker Prescribed: Yes


AnticoagulationTherapy for Atrial Fibrillation/Atrialflutter: No


Contraindication/Reason for not providing: no hx of a fib 


Aldosterone Antagonist Prescribed: No


Contraindication/Reason for not providing: risk for hyperkalemia 


Hydralazine Nitrate Prescribed: No


Contraindication/Reason for not providing: bp low 


Implantable Cardioverter Defibrillator Therapy: Yes


Contraindication/Reason for not providing: AICD


Cardiac Resynchronization Therapy Prescribed: No


Contraindication/Reason for not providing: aicd





- Follow up


Will be discharged to: Home


Follow Up Date (must be within 7 days from discharge): 07/31/18


Follow Up Time: 09:00